# Patient Record
Sex: MALE | Race: WHITE | NOT HISPANIC OR LATINO | Employment: OTHER | URBAN - METROPOLITAN AREA
[De-identification: names, ages, dates, MRNs, and addresses within clinical notes are randomized per-mention and may not be internally consistent; named-entity substitution may affect disease eponyms.]

---

## 2017-09-22 ENCOUNTER — APPOINTMENT (OUTPATIENT)
Dept: RADIOLOGY | Facility: CLINIC | Age: 72
End: 2017-09-22
Payer: MEDICARE

## 2017-09-22 ENCOUNTER — OFFICE VISIT (OUTPATIENT)
Dept: URGENT CARE | Facility: CLINIC | Age: 72
End: 2017-09-22
Payer: MEDICARE

## 2017-09-22 DIAGNOSIS — R05.9 COUGH: ICD-10-CM

## 2017-09-22 PROCEDURE — 71020 HB CHEST X-RAY 2VW FRONTAL&LATL: CPT

## 2017-09-22 PROCEDURE — 99203 OFFICE O/P NEW LOW 30 MIN: CPT

## 2017-09-22 PROCEDURE — G0463 HOSPITAL OUTPT CLINIC VISIT: HCPCS

## 2017-09-25 ENCOUNTER — GENERIC CONVERSION - ENCOUNTER (OUTPATIENT)
Dept: OTHER | Facility: OTHER | Age: 72
End: 2017-09-25

## 2018-01-15 NOTE — RESULT NOTES
Verified Results  * XR CHEST PA & LATERAL [id number] 04:56PM Maninder Mcgrath Order Number: [id number]     Test Name Result Flag Reference   XR CHEST PA & LATERAL (Report)     CHEST - DUAL ENERGY     INDICATION: Cough and left lung pain for 2 days  COMPARISON: None     VIEWS: PA (including soft tissue/bone algorithms) and lateral projections     IMAGES: 4     FINDINGS:        Cardiomediastinal silhouette appears unremarkable  There is a localized opacity projecting over the heart shadow at the left lung base which has generally triangular shape on the frontal image and is suspicious for segmental pneumonia  It is difficult to see on the lateral view  The right lung is    clear  There is no pleural fluid or pneumothorax  Visualized osseous structures appear within normal limits for the patient's age  IMPRESSION:     Findings suspicious for segmental pneumonia in the left lung base  Recommend follow-up to ensure resolution         ##imslh##imslh       Workstation performed: [id number]     Signed by:   Flavia Ellis MD   9/22/17

## 2019-12-22 ENCOUNTER — APPOINTMENT (EMERGENCY)
Dept: CT IMAGING | Facility: HOSPITAL | Age: 74
DRG: 682 | End: 2019-12-22
Payer: MEDICARE

## 2019-12-22 ENCOUNTER — HOSPITAL ENCOUNTER (INPATIENT)
Facility: HOSPITAL | Age: 74
LOS: 5 days | Discharge: NURSING, REHAB OR LONG-TERM CARE | DRG: 682 | End: 2019-12-27
Attending: EMERGENCY MEDICINE | Admitting: INTERNAL MEDICINE
Payer: MEDICARE

## 2019-12-22 DIAGNOSIS — N39.0 UTI (URINARY TRACT INFECTION): ICD-10-CM

## 2019-12-22 DIAGNOSIS — R26.2 AMBULATORY DYSFUNCTION: Primary | ICD-10-CM

## 2019-12-22 DIAGNOSIS — R47.1 DYSARTHRIA: ICD-10-CM

## 2019-12-22 DIAGNOSIS — R33.9 URINARY RETENTION: ICD-10-CM

## 2019-12-22 DIAGNOSIS — G93.41 ACUTE METABOLIC ENCEPHALOPATHY: ICD-10-CM

## 2019-12-22 DIAGNOSIS — R47.9 SPEECH DISTURBANCE: ICD-10-CM

## 2019-12-22 DIAGNOSIS — R29.90 STROKE-LIKE SYMPTOMS: ICD-10-CM

## 2019-12-22 DIAGNOSIS — N28.9 RENAL INSUFFICIENCY: ICD-10-CM

## 2019-12-22 DIAGNOSIS — I65.22 CAROTID STENOSIS, LEFT: ICD-10-CM

## 2019-12-22 PROBLEM — N17.9 AKI (ACUTE KIDNEY INJURY) (HCC): Status: ACTIVE | Noted: 2019-12-22

## 2019-12-22 PROBLEM — N30.00 ACUTE CYSTITIS: Status: ACTIVE | Noted: 2019-12-22

## 2019-12-22 PROBLEM — I10 ESSENTIAL HYPERTENSION: Status: ACTIVE | Noted: 2019-12-22

## 2019-12-22 PROBLEM — K21.9 GASTROESOPHAGEAL REFLUX DISEASE WITHOUT ESOPHAGITIS: Status: ACTIVE | Noted: 2019-12-22

## 2019-12-22 LAB
ALBUMIN SERPL BCP-MCNC: 4 G/DL (ref 3.5–5)
ALP SERPL-CCNC: 91 U/L (ref 46–116)
ALT SERPL W P-5'-P-CCNC: 51 U/L (ref 12–78)
AMPHETAMINES SERPL QL SCN: NEGATIVE
ANION GAP BLD CALC-SCNC: 15 MMOL/L (ref 4–13)
ANION GAP SERPL CALCULATED.3IONS-SCNC: 13 MMOL/L (ref 4–13)
APAP SERPL-MCNC: <2 UG/ML (ref 10–20)
APTT PPP: 28 SECONDS (ref 23–37)
AST SERPL W P-5'-P-CCNC: 33 U/L (ref 5–45)
BACTERIA UR QL AUTO: ABNORMAL /HPF
BARBITURATES UR QL: NEGATIVE
BASOPHILS # BLD AUTO: 0.01 THOUSANDS/ΜL (ref 0–0.1)
BASOPHILS NFR BLD AUTO: 0 % (ref 0–1)
BENZODIAZ UR QL: NEGATIVE
BILIRUB SERPL-MCNC: 1.4 MG/DL (ref 0.2–1)
BILIRUB UR QL STRIP: ABNORMAL
BUN BLD-MCNC: 48 MG/DL (ref 5–25)
BUN SERPL-MCNC: 49 MG/DL (ref 5–25)
CA-I BLD-SCNC: 1.19 MMOL/L (ref 1.12–1.32)
CALCIUM SERPL-MCNC: 9.6 MG/DL (ref 8.3–10.1)
CHLORIDE BLD-SCNC: 107 MMOL/L (ref 100–108)
CHLORIDE SERPL-SCNC: 105 MMOL/L (ref 100–108)
CLARITY UR: ABNORMAL
CO2 SERPL-SCNC: 26 MMOL/L (ref 21–32)
COCAINE UR QL: NEGATIVE
COLOR UR: ABNORMAL
CREAT BLD-MCNC: 1.5 MG/DL (ref 0.6–1.3)
CREAT SERPL-MCNC: 1.68 MG/DL (ref 0.6–1.3)
EOSINOPHIL # BLD AUTO: 0 THOUSAND/ΜL (ref 0–0.61)
EOSINOPHIL NFR BLD AUTO: 0 % (ref 0–6)
ERYTHROCYTE [DISTWIDTH] IN BLOOD BY AUTOMATED COUNT: 12.4 % (ref 11.6–15.1)
ETHANOL SERPL-MCNC: <3 MG/DL (ref 0–3)
GFR SERPL CREATININE-BSD FRML MDRD: 39 ML/MIN/1.73SQ M
GFR SERPL CREATININE-BSD FRML MDRD: 45 ML/MIN/1.73SQ M
GLUCOSE SERPL-MCNC: 131 MG/DL (ref 65–140)
GLUCOSE SERPL-MCNC: 134 MG/DL (ref 65–140)
GLUCOSE UR STRIP-MCNC: NEGATIVE MG/DL
HCT VFR BLD AUTO: 46.4 % (ref 36.5–49.3)
HCT VFR BLD CALC: 46 % (ref 36.5–49.3)
HGB BLD-MCNC: 15.7 G/DL (ref 12–17)
HGB BLDA-MCNC: 15.6 G/DL (ref 12–17)
HGB UR QL STRIP.AUTO: ABNORMAL
HYALINE CASTS #/AREA URNS LPF: ABNORMAL /LPF
IMM GRANULOCYTES # BLD AUTO: 0.05 THOUSAND/UL (ref 0–0.2)
IMM GRANULOCYTES NFR BLD AUTO: 0 % (ref 0–2)
INR PPP: 1.21 (ref 0.84–1.19)
KETONES UR STRIP-MCNC: NEGATIVE MG/DL
LACTATE SERPL-SCNC: 1.7 MMOL/L (ref 0.5–2)
LEUKOCYTE ESTERASE UR QL STRIP: NEGATIVE
LYMPHOCYTES # BLD AUTO: 0.6 THOUSANDS/ΜL (ref 0.6–4.47)
LYMPHOCYTES NFR BLD AUTO: 4 % (ref 14–44)
MCH RBC QN AUTO: 30.7 PG (ref 26.8–34.3)
MCHC RBC AUTO-ENTMCNC: 33.8 G/DL (ref 31.4–37.4)
MCV RBC AUTO: 91 FL (ref 82–98)
METHADONE UR QL: NEGATIVE
MONOCYTES # BLD AUTO: 1.59 THOUSAND/ΜL (ref 0.17–1.22)
MONOCYTES NFR BLD AUTO: 11 % (ref 4–12)
MUCOUS THREADS UR QL AUTO: ABNORMAL
NEUTROPHILS # BLD AUTO: 12.19 THOUSANDS/ΜL (ref 1.85–7.62)
NEUTS SEG NFR BLD AUTO: 85 % (ref 43–75)
NITRITE UR QL STRIP: NEGATIVE
NON-SQ EPI CELLS URNS QL MICRO: ABNORMAL /HPF
NRBC BLD AUTO-RTO: 0 /100 WBCS
OPIATES UR QL SCN: NEGATIVE
PCO2 BLD: 26 MMOL/L (ref 21–32)
PCP UR QL: NEGATIVE
PH UR STRIP.AUTO: 5.5 [PH]
PLATELET # BLD AUTO: 189 THOUSANDS/UL (ref 149–390)
PMV BLD AUTO: 10.2 FL (ref 8.9–12.7)
POTASSIUM BLD-SCNC: 4 MMOL/L (ref 3.5–5.3)
POTASSIUM SERPL-SCNC: 3.7 MMOL/L (ref 3.5–5.3)
PROT SERPL-MCNC: 7.8 G/DL (ref 6.4–8.2)
PROT UR STRIP-MCNC: ABNORMAL MG/DL
PROTHROMBIN TIME: 15.4 SECONDS (ref 11.6–14.5)
RBC # BLD AUTO: 5.11 MILLION/UL (ref 3.88–5.62)
RBC #/AREA URNS AUTO: ABNORMAL /HPF
SALICYLATES SERPL-MCNC: <3 MG/DL (ref 3–20)
SODIUM BLD-SCNC: 143 MMOL/L (ref 136–145)
SODIUM SERPL-SCNC: 144 MMOL/L (ref 136–145)
SP GR UR STRIP.AUTO: 1.02 (ref 1–1.03)
SPECIMEN SOURCE: ABNORMAL
THC UR QL: NEGATIVE
TROPONIN I SERPL-MCNC: <0.02 NG/ML
TSH SERPL DL<=0.05 MIU/L-ACNC: 0.48 UIU/ML (ref 0.36–3.74)
UROBILINOGEN UR QL STRIP.AUTO: 0.2 E.U./DL
WBC # BLD AUTO: 14.44 THOUSAND/UL (ref 4.31–10.16)
WBC #/AREA URNS AUTO: ABNORMAL /HPF

## 2019-12-22 PROCEDURE — 80053 COMPREHEN METABOLIC PANEL: CPT | Performed by: EMERGENCY MEDICINE

## 2019-12-22 PROCEDURE — 96361 HYDRATE IV INFUSION ADD-ON: CPT

## 2019-12-22 PROCEDURE — 80307 DRUG TEST PRSMV CHEM ANLYZR: CPT | Performed by: EMERGENCY MEDICINE

## 2019-12-22 PROCEDURE — 81001 URINALYSIS AUTO W/SCOPE: CPT | Performed by: EMERGENCY MEDICINE

## 2019-12-22 PROCEDURE — 83605 ASSAY OF LACTIC ACID: CPT | Performed by: EMERGENCY MEDICINE

## 2019-12-22 PROCEDURE — 80329 ANALGESICS NON-OPIOID 1 OR 2: CPT | Performed by: EMERGENCY MEDICINE

## 2019-12-22 PROCEDURE — 87040 BLOOD CULTURE FOR BACTERIA: CPT | Performed by: EMERGENCY MEDICINE

## 2019-12-22 PROCEDURE — 70498 CT ANGIOGRAPHY NECK: CPT

## 2019-12-22 PROCEDURE — 84443 ASSAY THYROID STIM HORMONE: CPT | Performed by: EMERGENCY MEDICINE

## 2019-12-22 PROCEDURE — 85025 COMPLETE CBC W/AUTO DIFF WBC: CPT | Performed by: EMERGENCY MEDICINE

## 2019-12-22 PROCEDURE — 99285 EMERGENCY DEPT VISIT HI MDM: CPT | Performed by: EMERGENCY MEDICINE

## 2019-12-22 PROCEDURE — 93005 ELECTROCARDIOGRAM TRACING: CPT

## 2019-12-22 PROCEDURE — 85730 THROMBOPLASTIN TIME PARTIAL: CPT | Performed by: EMERGENCY MEDICINE

## 2019-12-22 PROCEDURE — 74176 CT ABD & PELVIS W/O CONTRAST: CPT

## 2019-12-22 PROCEDURE — 71250 CT THORAX DX C-: CPT

## 2019-12-22 PROCEDURE — 72125 CT NECK SPINE W/O DYE: CPT

## 2019-12-22 PROCEDURE — 70496 CT ANGIOGRAPHY HEAD: CPT

## 2019-12-22 PROCEDURE — 99285 EMERGENCY DEPT VISIT HI MDM: CPT

## 2019-12-22 PROCEDURE — 36415 COLL VENOUS BLD VENIPUNCTURE: CPT | Performed by: EMERGENCY MEDICINE

## 2019-12-22 PROCEDURE — 84484 ASSAY OF TROPONIN QUANT: CPT | Performed by: EMERGENCY MEDICINE

## 2019-12-22 PROCEDURE — 96360 HYDRATION IV INFUSION INIT: CPT

## 2019-12-22 PROCEDURE — 80320 DRUG SCREEN QUANTALCOHOLS: CPT | Performed by: EMERGENCY MEDICINE

## 2019-12-22 PROCEDURE — 85014 HEMATOCRIT: CPT

## 2019-12-22 PROCEDURE — 85610 PROTHROMBIN TIME: CPT | Performed by: EMERGENCY MEDICINE

## 2019-12-22 PROCEDURE — 1124F ACP DISCUSS-NO DSCNMKR DOCD: CPT | Performed by: PHYSICIAN ASSISTANT

## 2019-12-22 PROCEDURE — 80047 BASIC METABLC PNL IONIZED CA: CPT

## 2019-12-22 PROCEDURE — 99222 1ST HOSP IP/OBS MODERATE 55: CPT | Performed by: PHYSICIAN ASSISTANT

## 2019-12-22 RX ORDER — SODIUM CHLORIDE 9 MG/ML
125 INJECTION, SOLUTION INTRAVENOUS CONTINUOUS
Status: DISCONTINUED | OUTPATIENT
Start: 2019-12-22 | End: 2019-12-22

## 2019-12-22 RX ORDER — CEFTRIAXONE 1 G/50ML
1000 INJECTION, SOLUTION INTRAVENOUS ONCE
Status: COMPLETED | OUTPATIENT
Start: 2019-12-22 | End: 2019-12-22

## 2019-12-22 RX ORDER — ALLOPURINOL 300 MG/1
300 TABLET ORAL DAILY
COMMUNITY

## 2019-12-22 RX ORDER — ATORVASTATIN CALCIUM 40 MG/1
40 TABLET, FILM COATED ORAL EVERY EVENING
Status: DISCONTINUED | OUTPATIENT
Start: 2019-12-22 | End: 2019-12-27 | Stop reason: HOSPADM

## 2019-12-22 RX ORDER — AMLODIPINE BESYLATE 10 MG/1
10 TABLET ORAL DAILY
COMMUNITY

## 2019-12-22 RX ORDER — SODIUM CHLORIDE 9 MG/ML
100 INJECTION, SOLUTION INTRAVENOUS CONTINUOUS
Status: DISCONTINUED | OUTPATIENT
Start: 2019-12-22 | End: 2019-12-24

## 2019-12-22 RX ORDER — ONDANSETRON 2 MG/ML
4 INJECTION INTRAMUSCULAR; INTRAVENOUS EVERY 6 HOURS PRN
Status: DISCONTINUED | OUTPATIENT
Start: 2019-12-22 | End: 2019-12-27 | Stop reason: HOSPADM

## 2019-12-22 RX ORDER — ASPIRIN 81 MG/1
81 TABLET, CHEWABLE ORAL DAILY
Status: DISCONTINUED | OUTPATIENT
Start: 2019-12-23 | End: 2019-12-27 | Stop reason: HOSPADM

## 2019-12-22 RX ORDER — OMEPRAZOLE 20 MG/1
20 CAPSULE, DELAYED RELEASE ORAL DAILY
COMMUNITY

## 2019-12-22 RX ADMIN — SODIUM CHLORIDE 125 ML/HR: 0.9 INJECTION, SOLUTION INTRAVENOUS at 19:10

## 2019-12-22 RX ADMIN — SODIUM CHLORIDE 100 ML/HR: 0.9 INJECTION, SOLUTION INTRAVENOUS at 20:30

## 2019-12-22 RX ADMIN — IODIXANOL 100 ML: 320 INJECTION, SOLUTION INTRAVASCULAR at 17:13

## 2019-12-22 RX ADMIN — CEFTRIAXONE 1000 MG: 1 INJECTION, SOLUTION INTRAVENOUS at 19:06

## 2019-12-22 RX ADMIN — SODIUM CHLORIDE 500 ML: 0.9 INJECTION, SOLUTION INTRAVENOUS at 16:31

## 2019-12-22 NOTE — ASSESSMENT & PLAN NOTE
Creatinine at 1 65  No document hx of CKD  Avoid nephrotoxic agents  AM CMP  Nephrology consult if no improvement

## 2019-12-22 NOTE — H&P
[facility]  H&P- Perez Pino 1945, 75 y o  male MRN: [id number]    Unit/Bed#: 426-01 Encounter: [id number]    Primary Care Provider: No primary care provider on file  Date and time admitted to hospital: 12/22/2019  4:16 PM        * Stroke-like symptoms  Assessment & Plan  He present to the ER with complaints of generalized weakness  His wife and daughter reports that he has become very confused with slurred speech over the last few days  She also reports that he had a fall 2-3 days ago hitting his head  No LOC  CTA shows-1   No acute intracranial hemorrhage, midline shift, or mass effect  2   Chronic small vessel ischemic changes  3   If there is continued clinical concern for acute infarct, further evaluation with MRI may be obtained which is more sensitive  4   80% narrowing of the left internal carotid artery just distal to the bifurcation  5   Mild to moderate atherosclerotic narrowing of the cavernous and supraclinoid portions of the bilateral internal carotid arteries  6   Mild focal narrowing of the intracranial right vertebral artery and moderate short segment narrowing of the intracranial left vertebral artery  7   No high-grade proximal stenosis of the visualized [place]  8   No significant intracranial arteriovenous malformation or aneurysm  Continue stroke pathway  Telemetry monitoring  Will keep NPO pending swallow eval  Daily aspirin, Lipitor  Check MRI  Check echo  Check A1C, lipid panel  Monitor blood pressure closely  OT,PT speech eval  A m  Labs  Neurology consult        Acute cystitis  Assessment & Plan  UA shows-4-10 WBCs, innumerable bacteria, moderate blood  Leukocytosis present  CT abdomen and pelvis shows- 4   Marked bladder distention  Started on IV ceftriaxone in the ER  Urine cultures blood cultures pending  Monitor urinary output, renal function  A m   Labs  Supportive care      KURT (acute kidney injury) [facility])  Assessment & Plan  Creatinine at 1 65  No document hx of CKD  Avoid nephrotoxic agents  AM CMP  Nephrology consult if no improvement    Gastroesophageal reflux disease without esophagitis  Assessment & Plan  He is currently on Prilosec for 20 mg p o  Daily  Will give formulary alternative Protonix 40 mg p o  Daily    Essential hypertension  Assessment & Plan  Blood pressure is stable  Continue PTA medciations      VTE Prophylaxis: Pharmacologic VTE Prophylaxis contraindicated due to hematuria on UA  / sequential compression device   Code Status: Level 1- Full code  POLST: POLST form is not discussed and not completed at this time  Discussion with family: Spoke to wife and Daughter Via phone    Anticipated Length of Stay:  Patient will be admitted on an Inpatient basis with an anticipated length of stay of  > 2 midnights  Justification for Hospital Stay: Stroke like symptoms, UTI, KURT    Total Time for Visit, including Counseling / Coordination of Care: 30 minutes  Greater than 50% of this total time spent on direct patient counseling and coordination of care  Chief Complaint:  Slurred speech    History of Present Illness:    Misha Mann is a 75 y o  male who presents to the emergency room with complaints of generalized weakness and slurred speech and increased confusion over last 2-3 days getting progressively worse  His family reports that he has been having several falls over the past couple of weeks most recent was about 3 days ago hitting his head however he had no LOC  His wife also reports that he had increased weakness to his lower extremities decrease in his ability to ambulate as well as he has not been eating or drinking very much over the last few days  His past medical history significant for GERD and hypertension  His family also reports that he has been evaluated at [facility] for possible dementia, Alzheimer's, Parkinson's  He also reports that he had several CT scans, MRI as well as EEG    He was also prescribed galantamine which was discontinued by his family as they thought that it was contributing to his complaints of headaches  Labs completed in the ER with results as shown below  CTA head and neck, CT chest abdomen and pelvis, and CT cervical spine completed with results shown below  He received normal saline bolus 500 cc in the ER  He was also started on ceftriaxone 1 g IV  At bedside he is awake obviously confused  He does not formulate any specific complaints  The patient has been admitted on inpatient status med [facility] level care for further workup and management stroke-like symptoms, acute cystitis, acute kidney injury    Review of Systems:    Review of Systems   Unable to perform ROS: Mental status change       Past Medical and Surgical History:     Past Medical History:   Diagnosis Date    GERD (gastroesophageal reflux disease)     Hypertension        History reviewed  No pertinent surgical history  Meds/Allergies:    Prior to Admission medications    Not on File     I have reviewed home medications with patient family member  Allergies: Allergies   Allergen Reactions    Penicillins        Social History:     Marital Status: /Civil Union   Occupation: Retired  Patient Pre-hospital Living Situation: Lives with wife  Patient Pre-hospital Level of Mobility: Active, uses cane and walker  Wife reports mobility has decreased significantly over the pas several days  Patient Pre-hospital Diet Restrictions: None reported  Substance Use History:   Social History     Substance and Sexual Activity   Alcohol Use Never    Frequency: Never     Social History     Tobacco Use   Smoking Status Former Smoker   Smokeless Tobacco Never Used     Social History     Substance and Sexual Activity   Drug Use Never       Family History:    History reviewed  No pertinent family history      Physical Exam:     Vitals:   Blood Pressure: 122/66 (12/22/19 2232)  Pulse: 91 (12/22/19 2232)  Temperature: 98 5 °F (36 9 °C) (12/22/19 2232)  Temp Source: Temporal (12/22/19 2232)  Respirations: 18 (12/22/19 2232)  Weight - Scale: 69 4 kg (153 lb) (12/22/19 1926)  SpO2: 96 % (12/22/19 2232)    Physical Exam   Constitutional: No distress  HENT:   Head: Normocephalic and atraumatic  Mouth/Throat: Oropharynx is clear and moist    Eyes: Pupils are equal, round, and reactive to light  EOM are normal  Right eye exhibits no discharge  Left eye exhibits no discharge  No scleral icterus  Neck: Neck supple  No JVD present  Cardiovascular: Normal rate, regular rhythm and intact distal pulses  Pulmonary/Chest: Effort normal and breath sounds normal  No stridor  No respiratory distress  He has no wheezes  He has no rales  Abdominal: Soft  Bowel sounds are normal  He exhibits no distension and no mass  There is no tenderness  There is no guarding  Genitourinary:   Genitourinary Comments: Intact hoyos cath   Musculoskeletal: He exhibits no edema, tenderness or deformity  2/5 strength bilateral lower extremities   Lymphadenopathy:     He has no cervical adenopathy  Neurological:   Awake ,confused   Skin: Skin is warm and dry  Capillary refill takes less than 2 seconds  No rash noted  He is not diaphoretic  No erythema  No pallor  Vitals reviewed  Additional Data:     Lab Results: I have personally reviewed pertinent reports        Results from last 7 days   Lab Units 12/22/19  1629   WBC Thousand/uL 14 44*   HEMOGLOBIN g/dL 15 7   I STAT HEMOGLOBIN g/dl 15 6   HEMATOCRIT % 46 4   HEMATOCRIT, ISTAT % 46   PLATELETS Thousands/uL 189   NEUTROS PCT % 85*   LYMPHS PCT % 4*   MONOS PCT % 11   EOS PCT % 0     Results from last 7 days   Lab Units 12/22/19  1629   SODIUM mmol/L 144   POTASSIUM mmol/L 3 7   CHLORIDE mmol/L 105   CO2 mmol/L 26   CO2, I-STAT mmol/L 26   BUN mg/dL 49*   CREATININE mg/dL 1 68*   AGAP mmol/L 15*   ANION GAP mmol/L 13   CALCIUM mg/dL 9 6   ALBUMIN g/dL 4 0   TOTAL BILIRUBIN mg/dL 1 40*   ALK PHOS U/L 91   ALT U/L 51   AST U/L 33   GLUCOSE RANDOM mg/dL 131     Results from last 7 days   Lab Units 12/22/19  1629   INR  1 21*             Results from last 7 days   Lab Units 12/22/19  1629   LACTIC ACID mmol/L 1 7       Imaging: I have personally reviewed pertinent reports  CT chest abdomen pelvis wo contrast   Final Result by Li Moore MD (12/22 1811)         1  No definite acute visceral and/or osseous injury in the chest, abdomen, or pelvis  2   Debris in the proximal trachea likely mucous secretions  3   Abundant stool in the rectum with secondary stercoral colitis  4   Marked bladder distention  Workstation performed: [id number]         CTA head and neck with and without contrast   Final Result by Elisabeth Carrizales MD (12/22 1803)      1  No acute intracranial hemorrhage, midline shift, or mass effect  2   Chronic small vessel ischemic changes  3   If there is continued clinical concern for acute infarct, further evaluation with MRI may be obtained which is more sensitive  4   80% narrowing of the left internal carotid artery just distal to the bifurcation  5   Mild to moderate atherosclerotic narrowing of the cavernous and supraclinoid portions of the bilateral internal carotid arteries  6   Mild focal narrowing of the intracranial right vertebral artery and moderate short segment narrowing of the intracranial left vertebral artery  7   No high-grade proximal stenosis of the visualized [place]  8   No significant intracranial arteriovenous malformation or aneurysm  I personally discussed this study with Sara Langley on 12/22/2019 at 6:03 PM       Workstation performed: [id number]         CT cervical spine without contrast   Final Result by Elisabeth Carrizales MD (12/22 1803)      No cervical spine fracture or traumatic malalignment    Moderate multilevel degenerative changes most pronounced at C5-C6 where there is severe neural foraminal narrowing on the left and moderate neural foraminal narrowing on the right  Workstation performed: [id number]         MRI Inpatient Order    (Results Pending)       EKG, Pathology, and Other Studies Reviewed on Admission:   · EKG: Sinus tachycardia at rate of 104 bpm    Allscripts / Epic Records Reviewed: Yes     ** Please Note: This note has been constructed using a voice recognition system   **

## 2019-12-22 NOTE — ASSESSMENT & PLAN NOTE
He is currently on Prilosec for 20 mg p o  Daily  Will give formulary alternative Protonix 40 mg p o   Daily

## 2019-12-22 NOTE — ASSESSMENT & PLAN NOTE
UA shows-4-10 WBCs, innumerable bacteria, moderate blood  Leukocytosis present  CT abdomen and pelvis shows- 4   Marked bladder distention  Started on IV ceftriaxone in the ER  Urine cultures blood cultures pending  Monitor urinary output, renal function  A m   Labs  Supportive care

## 2019-12-22 NOTE — ED PROCEDURE NOTE
PROCEDURE  ECG 12 Lead Documentation Only  Date/Time: 12/22/2019 5:23 PM  Performed by: Penny Gutierrez DO  Authorized by: Penny Gutierrez DO     Indications / Diagnosis:  Weakness   ECG reviewed by me, the ED Provider: yes    Patient location:  ED  Previous ECG:     Previous ECG:  Unavailable  Interpretation:     Interpretation: non-specific    Rate:     ECG rate:  104    ECG rate assessment comment:  Unable to be determined  Rhythm:     Rhythm comment:  Unable to be determined  Ectopy:     Ectopy comment:  EKG appears sinus on the monitor         Penny Gutierrez DO  12/22/19 1724

## 2019-12-22 NOTE — ED PROVIDER NOTES
History  Chief Complaint   Patient presents with    Slurred Speech     slurred speech for 1 week  also has falls x1 month  74 y/o male with "dementia" who has been being seen at the [place] presents with increased weakness with falls x 2 weeks  Patients family took him off of Galantamine and tamosulin  Patient has been worked up for multiple forms dementia  Family feels though is not eating well and is unable ambulate on his own  As per family over the course last 2 weeks patient has declined precipitously since he has had multiple falls  History provided by:  Patient and relative  Fatigue   Severity:  Mild  Onset quality:  Gradual  Timing:  Constant  Progression:  Worsening  Context: not alcohol use, not allergies and not change in medication    Relieved by:  Nothing  Worsened by:  Nothing  Ineffective treatments:  None tried  Associated symptoms: anorexia, ataxia and difficulty walking    Associated symptoms: no abdominal pain, no aphasia, no arthralgias, no chest pain, no cough, no diarrhea, no dizziness, no drooling, no dysuria and no headaches    Risk factors: no anemia and no congestive heart failure        None       Past Medical History:   Diagnosis Date    GERD (gastroesophageal reflux disease)     Hypertension        History reviewed  No pertinent surgical history  History reviewed  No pertinent family history  I have reviewed and agree with the history as documented  Social History     Tobacco Use    Smoking status: Former Smoker    Smokeless tobacco: Never Used   Substance Use Topics    Alcohol use: Never     Frequency: Never    Drug use: Never        Review of Systems   Constitutional: Positive for fatigue  HENT: Negative  Negative for congestion, dental problem, drooling and ear discharge  Eyes: Negative for discharge and itching  Respiratory: Negative for cough  Cardiovascular: Negative for chest pain  Gastrointestinal: Positive for anorexia   Negative for abdominal pain and diarrhea  Endocrine: Negative for cold intolerance, heat intolerance and polydipsia  Genitourinary: Negative for difficulty urinating, dysuria and enuresis  Musculoskeletal: Negative for arthralgias  Skin: Negative for color change, pallor and rash  Allergic/Immunologic: Negative for environmental allergies and food allergies  Neurological: Negative for dizziness, facial asymmetry and headaches  Hematological: Negative for adenopathy  Psychiatric/Behavioral: Negative for agitation and behavioral problems  Physical Exam  Physical Exam   Constitutional: He is oriented to person, place, and time  He appears well-developed  HENT:   Head: Normocephalic  Right Ear: External ear normal    Left Ear: External ear normal    Eyes: Pupils are equal, round, and reactive to light  Right eye exhibits no discharge  Left eye exhibits no discharge  Neck: Normal range of motion  No tracheal deviation present  No thyromegaly present  Cardiovascular: Normal rate, regular rhythm and normal heart sounds  Pulmonary/Chest: Effort normal  No stridor  No respiratory distress  Abdominal: Soft  He exhibits no distension  There is no tenderness  There is no guarding  Musculoskeletal: He exhibits no edema or deformity  Rigid but able to bend extremities   Neurological: He is alert and oriented to person, place, and time  No cranial nerve deficit  Coordination normal    Skin: Skin is warm  Capillary refill takes less than 2 seconds  No erythema  No pallor  Psychiatric:   Blunted affect   Vitals reviewed        Vital Signs  ED Triage Vitals [12/22/19 1618]   Temperature Pulse Respirations Blood Pressure SpO2   99 6 °F (37 6 °C) 91 14 157/74 95 %      Temp Source Heart Rate Source Patient Position - Orthostatic VS BP Location FiO2 (%)   Oral Monitor Lying Left arm --      Pain Score       No Pain           Vitals:    12/22/19 1618   BP: 157/74   Pulse: 91   Patient Position - Orthostatic VS: Lying Visual Acuity  Visual Acuity      Most Recent Value   L Pupil Size (mm)  2   R Pupil Size (mm)  2   L Pupil Shape  Round   R Pupil Shape  Round          ED Medications  Medications   iohexol (OMNIPAQUE) 350 MG/ML injection (SINGLE-DOSE) 100 mL ( Intravenous Not Given 12/22/19 1713)   iohexol (OMNIPAQUE) 350 MG/ML injection (SINGLE-DOSE) 70 mL ( Intravenous Not Given 12/22/19 1713)   sodium chloride 0 9 % infusion (has no administration in time range)   sodium chloride 0 9 % bolus 500 mL (500 mL Intravenous New Bag 12/22/19 1631)   iodixanol (VISIPAQUE) 320 MG/ML injection 100 mL (100 mL Intravenous Given 12/22/19 1713)       Diagnostic Studies  Results Reviewed     Procedure Component Value Units Date/Time    Rapid drug screen, urine [[id number]]  (Normal) Collected:  12/22/19 1731    Lab Status:  Final result Specimen:  Urine, Other Updated:  12/22/19 1749     Amph/Meth UR Negative     Barbiturate Ur Negative     Benzodiazepine Urine Negative     Cocaine Urine Negative     Methadone Urine Negative     Opiate Urine Negative     PCP Ur Negative     THC Urine Negative    Narrative:       FOR MEDICAL PURPOSES ONLY  IF CONFIRMATION NEEDED PLEASE CONTACT THE LAB WITHIN 5 DAYS      Drug Screen Cutoff Levels:  AMPHETAMINE/METHAMPHETAMINES  1000 ng/mL  BARBITURATES     200 ng/mL  BENZODIAZEPINES     200 ng/mL  COCAINE      300 ng/mL  METHADONE      300 ng/mL  OPIATES      300 ng/mL  PHENCYCLIDINE     25 ng/mL  THC       50 ng/mL      Urine Microscopic [[id number]]  (Abnormal) Collected:  12/22/19 1729    Lab Status:  Final result Specimen:  Urine, Other Updated:  12/22/19 1744     RBC, UA 2-4 /hpf      WBC, UA 4-10 /hpf      Epithelial Cells Occasional /hpf      Bacteria, UA Innumerable /hpf      Hyaline Casts, UA 0-1 /lpf      MUCUS THREADS Moderate    UA w Reflex to Microscopic w Reflex to Culture [[id number]]  (Abnormal) Collected:  12/22/19 1729    Lab Status:  Final result Specimen:  Urine, Other Updated: 12/22/19 1737     Color, UA Alicia     Clarity, UA Slightly Cloudy     Specific Gravity, UA 1 025     pH, UA 5 5     Leukocytes, UA Negative     Nitrite, UA Negative     Protein, UA 30 (1+) mg/dl      Glucose, UA Negative mg/dl      Ketones, UA Negative mg/dl      Urobilinogen, UA 0 2 E U /dl      Bilirubin, UA Interference- unable to analyze     Blood, UA Moderate    Blood culture #1 [[id number]]     Lab Status:  No result Specimen:  Blood     Blood culture #2 [[id number]]     Lab Status:  No result Specimen:  Blood     TSH [[id number]]  (Normal) Collected:  12/22/19 1629    Lab Status:  Final result Specimen:  Blood from Arm, Left Updated:  12/22/19 1700     TSH 3RD GENERATON 0 481 uIU/mL     Narrative:       Patients undergoing fluorescein dye angiography may retain small amounts of fluorescein in the body for 48-72 hours post procedure  Samples containing fluorescein can produce falsely depressed TSH values  If the patient had this procedure,a specimen should be resubmitted post fluorescein clearance  Salicylate level [[id number]]  (Abnormal) Collected:  12/22/19 1629    Lab Status:  Final result Specimen:  Blood from Arm, Left Updated:  18/42/91 0162     Salicylate Lvl <0 6 mg/dL     Acetaminophen level-If concentration is detectable, please discuss with medical  on call  [[id number]]  (Abnormal) Collected:  12/22/19 1629    Lab Status:  Final result Specimen:  Blood from Arm, Left Updated:  12/22/19 1700     Acetaminophen Level <2 0 ug/mL     Troponin I [[id number]]  (Normal) Collected:  12/22/19 1629    Lab Status:  Final result Specimen:  Blood from Arm, Left Updated:  12/22/19 1653     Troponin I <0 02 ng/mL     Lactic acid, plasma [[id number]]  (Normal) Collected:  12/22/19 1629    Lab Status:  Final result Specimen:  Blood from Arm, Left Updated:  12/22/19 1653     LACTIC ACID 1 7 mmol/L     Narrative:       Result may be elevated if tourniquet was used during collection      Comprehensive metabolic panel [[id number]]  (Abnormal) Collected:  12/22/19 1629    Lab Status:  Final result Specimen:  Blood from Arm, Left Updated:  12/22/19 1651     Sodium 144 mmol/L      Potassium 3 7 mmol/L      Chloride 105 mmol/L      CO2 26 mmol/L      ANION GAP 13 mmol/L      BUN 49 mg/dL      Creatinine 1 68 mg/dL      Glucose 131 mg/dL      Calcium 9 6 mg/dL      AST 33 U/L      ALT 51 U/L      Alkaline Phosphatase 91 U/L      Total Protein 7 8 g/dL      Albumin 4 0 g/dL      Total Bilirubin 1 40 mg/dL      eGFR 39 ml/min/1 73sq m     Narrative:       [place] guidelines for Chronic Kidney Disease (CKD):     Stage 1 with normal or high GFR (GFR > 90 mL/min/1 73 square meters)    Stage 2 Mild CKD (GFR = 60-89 mL/min/1 73 square meters)    Stage 3A Moderate CKD (GFR = 45-59 mL/min/1 73 square meters)    Stage 3B Moderate CKD (GFR = 30-44 mL/min/1 73 square meters)    Stage 4 Severe CKD (GFR = 15-29 mL/min/1 73 square meters)    Stage 5 End Stage CKD (GFR <15 mL/min/1 73 square meters)  Note: GFR calculation is accurate only with a steady state creatinine    Protime-INR [[id number]]  (Abnormal) Collected:  12/22/19 1629    Lab Status:  Final result Specimen:  Blood from Arm, Left Updated:  12/22/19 1645     Protime 15 4 seconds      INR 1 21    APTT [[id number]]  (Normal) Collected:  12/22/19 1629    Lab Status:  Final result Specimen:  Blood from Arm, Left Updated:  12/22/19 1645     PTT 28 seconds     Ethanol [[id number]]  (Normal) Collected:  12/22/19 1629    Lab Status:  Final result Specimen:  Blood from Arm, Left Updated:  12/22/19 1644     Ethanol Lvl <3 mg/dL     CBC and differential [[id number]]  (Abnormal) Collected:  12/22/19 1629    Lab Status:  Final result Specimen:  Blood from Arm, Left Updated:  12/22/19 1634     WBC 14 44 Thousand/uL      RBC 5 11 Million/uL      Hemoglobin 15 7 g/dL      Hematocrit 46 4 %      MCV 91 fL      MCH 30 7 pg      MCHC 33 8 g/dL      RDW 12 4 % MPV 10 2 fL      Platelets 631 Thousands/uL      nRBC 0 /100 WBCs      Neutrophils Relative 85 %      Immat GRANS % 0 %      Lymphocytes Relative 4 %      Monocytes Relative 11 %      Eosinophils Relative 0 %      Basophils Relative 0 %      Neutrophils Absolute 12 19 Thousands/µL      Immature Grans Absolute 0 05 Thousand/uL      Lymphocytes Absolute 0 60 Thousands/µL      Monocytes Absolute 1 59 Thousand/µL      Eosinophils Absolute 0 00 Thousand/µL      Basophils Absolute 0 01 Thousands/µL     POCT Chem 8+ [[id number]]  (Abnormal) Collected:  12/22/19 1629    Lab Status:  Final result Specimen:  Venous Updated:  12/22/19 1633     SODIUM, I-STAT 143 mmol/l      Potassium, i-STAT 4 0 mmol/L      Chloride, istat 107 mmol/L      CO2, i-STAT 26 mmol/L      Anion Gap, i-STAT 15 mmol/L      Calcium, Ionized i-STAT 1 19 mmol/L      BUN, I-STAT 48 mg/dl      Creatinine, i-STAT 1 5 mg/dl      eGFR 45 ml/min/1 73sq m      Glucose, i-STAT 134 mg/dl      Hct, i-STAT 46 %      Hgb, i-STAT 15 6 g/dl      Specimen Type VENOUS    Narrative:       National Kid[employer] Disease Foundation guidelines for Chronic Kidney Disease (CKD):     Stage 1 with normal or high GFR (GFR > 90 mL/min/1 73 square meters)    Stage 2 Mild CKD (GFR = 60-89 mL/min/1 73 square meters)    Stage 3A Moderate CKD (GFR = 45-59 mL/min/1 73 square meters)    Stage 3B Moderate CKD (GFR = 30-44 mL/min/1 73 square meters)    Stage 4 Severe CKD (GFR = 15-29 mL/min/1 73 square meters)    Stage 5 End Stage CKD (GFR <15 mL/min/1 73 square meters)  Note: GFR calculation is accurate only with a steady state creatinine                 CTA head and neck with and without contrast    (Results Pending)   CT cervical spine without contrast    (Results Pending)   CT chest abdomen pelvis wo contrast    (Results Pending)              Procedures  Procedures         ED Course         HEART Risk Score      Most Recent Value   History  1 Filed at: 12/22/2019 1722   ECG  1 Filed at: 12/22/2019 1722   Age  2 Filed at: 12/22/2019 1722   Risk Factors  1 Filed at: 12/22/2019 1722   Troponin  0 Filed at: 12/22/2019 1722   Heart Score Risk Calculator   History  1 Filed at: 12/22/2019 1722   ECG  1 Filed at: 12/22/2019 1722   Age  2 Filed at: 12/22/2019 1722   Risk Factors  1 Filed at: 12/22/2019 1722   Troponin  0 Filed at: 12/22/2019 1722   HEART Score  5 Filed at: 12/22/2019 1722   HEART Score  5 Filed at: 12/22/2019 1722                            MDM  Number of Diagnoses or Management Options  Ambulatory dysfunction:   Renal insufficiency:   Speech disturbance:   UTI (urinary tract infection):   Diagnosis management comments: Differential diagnosis 1  CVA 2  TIA 3  Electrolyte abnormality    17:14  I spoke with family - forgetfulness began 5 years ago  Tremors 3 years ago- [facility] thought Parkinsons  Lewy Body dementia   Patient fell 4 days ago and has not been eating or drinking     [place] catheter placed    18:00  I Spoke with Dr Renetta Zarate - no acute stroke    Narrowing of the left internal carotid        Amount and/or Complexity of Data Reviewed  Clinical lab tests: ordered and reviewed  Tests in the radiology section of CPT®: ordered and reviewed  Tests in the medicine section of CPT®: reviewed and ordered    Risk of Complications, Morbidity, and/or Mortality  Presenting problems: high  Diagnostic procedures: high  Management options: high          Disposition  Final diagnoses:   Ambulatory dysfunction   Renal insufficiency     Time reflects when diagnosis was documented in both MDM as applicable and the Disposition within this note     Time User Action Codes Description Comment    12/22/2019  5:21 PM Ramirez Kenny Add [R26 2] Ambulatory dysfunction     12/22/2019  5:21 PM Ramirez Kenny Add [N28 9] Renal insufficiency       ED Disposition     ED Disposition Condition Date/Time Comment    Admit Stable Sun Dec 22, 2019  5:21 PM         Follow-up Information    None         Patient's Medications    No medications on file     No discharge procedures on file      ED Provider  Electronically Signed by           Jose R Vincent DO  12/22/19 2545

## 2019-12-23 ENCOUNTER — APPOINTMENT (INPATIENT)
Dept: MRI IMAGING | Facility: HOSPITAL | Age: 74
DRG: 682 | End: 2019-12-23
Payer: MEDICARE

## 2019-12-23 ENCOUNTER — APPOINTMENT (INPATIENT)
Dept: NON INVASIVE DIAGNOSTICS | Facility: HOSPITAL | Age: 74
DRG: 682 | End: 2019-12-23
Payer: MEDICARE

## 2019-12-23 PROBLEM — R47.1 DYSARTHRIA: Status: ACTIVE | Noted: 2019-12-22

## 2019-12-23 PROBLEM — E87.0 HYPERNATREMIA: Status: ACTIVE | Noted: 2019-12-23

## 2019-12-23 PROBLEM — R53.1 GENERALIZED WEAKNESS: Status: ACTIVE | Noted: 2019-12-23

## 2019-12-23 PROBLEM — G93.41 ACUTE METABOLIC ENCEPHALOPATHY: Status: ACTIVE | Noted: 2019-12-23

## 2019-12-23 LAB
ALBUMIN SERPL BCP-MCNC: 3.2 G/DL (ref 3.5–5)
ALP SERPL-CCNC: 76 U/L (ref 46–116)
ALT SERPL W P-5'-P-CCNC: 45 U/L (ref 12–78)
ANION GAP SERPL CALCULATED.3IONS-SCNC: 12 MMOL/L (ref 4–13)
AST SERPL W P-5'-P-CCNC: 34 U/L (ref 5–45)
ATRIAL RATE: 91 BPM
ATRIAL RATE: 97 BPM
BILIRUB SERPL-MCNC: 1.1 MG/DL (ref 0.2–1)
BUN SERPL-MCNC: 33 MG/DL (ref 5–25)
CALCIUM SERPL-MCNC: 8.8 MG/DL (ref 8.3–10.1)
CHLORIDE SERPL-SCNC: 112 MMOL/L (ref 100–108)
CHOLEST SERPL-MCNC: 105 MG/DL (ref 50–200)
CO2 SERPL-SCNC: 25 MMOL/L (ref 21–32)
CREAT SERPL-MCNC: 1.01 MG/DL (ref 0.6–1.3)
ERYTHROCYTE [DISTWIDTH] IN BLOOD BY AUTOMATED COUNT: 12.4 % (ref 11.6–15.1)
EST. AVERAGE GLUCOSE BLD GHB EST-MCNC: 97 MG/DL
GFR SERPL CREATININE-BSD FRML MDRD: 72 ML/MIN/1.73SQ M
GLUCOSE SERPL-MCNC: 95 MG/DL (ref 65–140)
HBA1C MFR BLD: 5 % (ref 4.2–6.3)
HCT VFR BLD AUTO: 40.2 % (ref 36.5–49.3)
HDLC SERPL-MCNC: 41 MG/DL
HGB BLD-MCNC: 13.5 G/DL (ref 12–17)
LDLC SERPL CALC-MCNC: 53 MG/DL (ref 0–100)
MAGNESIUM SERPL-MCNC: 2.2 MG/DL (ref 1.6–2.6)
MCH RBC QN AUTO: 31 PG (ref 26.8–34.3)
MCHC RBC AUTO-ENTMCNC: 33.6 G/DL (ref 31.4–37.4)
MCV RBC AUTO: 92 FL (ref 82–98)
P AXIS: 65 DEGREES
P AXIS: 75 DEGREES
PHOSPHATE SERPL-MCNC: 3.2 MG/DL (ref 2.3–4.1)
PLATELET # BLD AUTO: 165 THOUSANDS/UL (ref 149–390)
PMV BLD AUTO: 10.5 FL (ref 8.9–12.7)
POTASSIUM SERPL-SCNC: 3.4 MMOL/L (ref 3.5–5.3)
PR INTERVAL: 128 MS
PR INTERVAL: 154 MS
PROT SERPL-MCNC: 6.6 G/DL (ref 6.4–8.2)
QRS AXIS: -3 DEGREES
QRS AXIS: -7 DEGREES
QRSD INTERVAL: 74 MS
QRSD INTERVAL: 80 MS
QT INTERVAL: 356 MS
QT INTERVAL: 374 MS
QTC INTERVAL: 491 MS
QTC INTERVAL: 492 MS
RBC # BLD AUTO: 4.35 MILLION/UL (ref 3.88–5.62)
SODIUM SERPL-SCNC: 149 MMOL/L (ref 136–145)
T WAVE AXIS: -44 DEGREES
T WAVE AXIS: 58 DEGREES
TRIGL SERPL-MCNC: 54 MG/DL
VENTRICULAR RATE: 104 BPM
VENTRICULAR RATE: 115 BPM
WBC # BLD AUTO: 10.66 THOUSAND/UL (ref 4.31–10.16)

## 2019-12-23 PROCEDURE — 84100 ASSAY OF PHOSPHORUS: CPT | Performed by: PHYSICIAN ASSISTANT

## 2019-12-23 PROCEDURE — 85027 COMPLETE CBC AUTOMATED: CPT | Performed by: PHYSICIAN ASSISTANT

## 2019-12-23 PROCEDURE — 83735 ASSAY OF MAGNESIUM: CPT | Performed by: PHYSICIAN ASSISTANT

## 2019-12-23 PROCEDURE — G8978 MOBILITY CURRENT STATUS: HCPCS

## 2019-12-23 PROCEDURE — 70551 MRI BRAIN STEM W/O DYE: CPT

## 2019-12-23 PROCEDURE — G8987 SELF CARE CURRENT STATUS: HCPCS

## 2019-12-23 PROCEDURE — 97530 THERAPEUTIC ACTIVITIES: CPT

## 2019-12-23 PROCEDURE — 93010 ELECTROCARDIOGRAM REPORT: CPT | Performed by: INTERNAL MEDICINE

## 2019-12-23 PROCEDURE — 93306 TTE W/DOPPLER COMPLETE: CPT

## 2019-12-23 PROCEDURE — 80053 COMPREHEN METABOLIC PANEL: CPT | Performed by: PHYSICIAN ASSISTANT

## 2019-12-23 PROCEDURE — 92610 EVALUATE SWALLOWING FUNCTION: CPT

## 2019-12-23 PROCEDURE — G8979 MOBILITY GOAL STATUS: HCPCS

## 2019-12-23 PROCEDURE — G8988 SELF CARE GOAL STATUS: HCPCS

## 2019-12-23 PROCEDURE — 80061 LIPID PANEL: CPT | Performed by: PHYSICIAN ASSISTANT

## 2019-12-23 PROCEDURE — 93306 TTE W/DOPPLER COMPLETE: CPT | Performed by: INTERNAL MEDICINE

## 2019-12-23 PROCEDURE — G8996 SWALLOW CURRENT STATUS: HCPCS

## 2019-12-23 PROCEDURE — 99232 SBSQ HOSP IP/OBS MODERATE 35: CPT | Performed by: PHYSICIAN ASSISTANT

## 2019-12-23 PROCEDURE — G8997 SWALLOW GOAL STATUS: HCPCS

## 2019-12-23 PROCEDURE — 97167 OT EVAL HIGH COMPLEX 60 MIN: CPT

## 2019-12-23 PROCEDURE — 83036 HEMOGLOBIN GLYCOSYLATED A1C: CPT | Performed by: PHYSICIAN ASSISTANT

## 2019-12-23 PROCEDURE — 97535 SELF CARE MNGMENT TRAINING: CPT

## 2019-12-23 PROCEDURE — 97163 PT EVAL HIGH COMPLEX 45 MIN: CPT

## 2019-12-23 RX ORDER — CEFTRIAXONE 1 G/50ML
1000 INJECTION, SOLUTION INTRAVENOUS EVERY 24 HOURS
Status: DISCONTINUED | OUTPATIENT
Start: 2019-12-23 | End: 2019-12-26

## 2019-12-23 RX ADMIN — ENOXAPARIN SODIUM 40 MG: 40 INJECTION SUBCUTANEOUS at 15:15

## 2019-12-23 RX ADMIN — SODIUM CHLORIDE 100 ML/HR: 0.9 INJECTION, SOLUTION INTRAVENOUS at 14:28

## 2019-12-23 RX ADMIN — CEFTRIAXONE 1000 MG: 1 INJECTION, SOLUTION INTRAVENOUS at 17:29

## 2019-12-23 RX ADMIN — ATORVASTATIN CALCIUM 40 MG: 40 TABLET, FILM COATED ORAL at 17:20

## 2019-12-23 RX ADMIN — SODIUM CHLORIDE 100 ML/HR: 0.9 INJECTION, SOLUTION INTRAVENOUS at 02:45

## 2019-12-23 NOTE — ASSESSMENT & PLAN NOTE
He present to the ER with complaints of generalized weakness  His wife and daughter reports that he has become very confused with slurred speech over the last few days  She also reports that he had a fall 2-3 days ago hitting his head  No LOC  CTA shows-1   No acute intracranial hemorrhage, midline shift, or mass effect  2   Chronic small vessel ischemic changes  3   If there is continued clinical concern for acute infarct, further evaluation with MRI may be obtained which is more sensitive  4   80% narrowing of the left internal carotid artery just distal to the bifurcation  5   Mild to moderate atherosclerotic narrowing of the cavernous and supraclinoid portions of the bilateral internal carotid arteries  6   Mild focal narrowing of the intracranial right vertebral artery and moderate short segment narrowing of the intracranial left vertebral artery  7   No high-grade proximal stenosis of the visualized [place]  8   No significant intracranial arteriovenous malformation or aneurysm  Continue stroke pathway  Telemetry monitoring  Will keep NPO pending swallow eval  Daily aspirin, Lipitor  Check MRI  Check echo  Check A1C, lipid panel  Monitor blood pressure closely  OT,PT speech eval  A m   Labs  Neurology consult

## 2019-12-23 NOTE — ASSESSMENT & PLAN NOTE
Patient has acute on chronic dysarthria  Improved today per patient's daughter  It's possible this is a TIA vs  Metabolic encephalopathy  CTA shows-1   No acute intracranial hemorrhage, midline shift, or mass effect  2   Chronic small vessel ischemic changes  3   If there is continued clinical concern for acute infarct, further evaluation with MRI may be obtained which is more sensitive  4   80% narrowing of the left internal carotid artery just distal to the bifurcation  5   Mild to moderate atherosclerotic narrowing of the cavernous and supraclinoid portions of the bilateral internal carotid arteries  6   Mild focal narrowing of the intracranial right vertebral artery and moderate short segment narrowing of the intracranial left vertebral artery  7   No high-grade proximal stenosis of the visualized [place]  8   No significant intracranial arteriovenous malformation or aneurysm  MRI brain:   Chronic lacunar infarct in the left centrum semiovale  No mass, mass effect, midline shift  No hemorrhage identified  No evidence of recent infarct  Chronic lacunar infarcts bilateral cerebellum  Small scattered hyperintensities   on T2/FLAIR imaging are noted in the periventricular and subcortical white matter demonstrating an appearance that is statistically most likely to represent mild microangiopathic change  Continue stroke pathway  Telemetry monitoring - no a fib noted on telemetry thus far  Will keep NPO pending swallow eval  Continue Daily aspirin, Lipitor given stenosis of L ICA  Echocardiogram - unremarkable  Check A1C, lipid panel - both well controlled  Monitor blood pressure closely - so far is controlled well  OT,PT speech eval - still pending  Spoke with Dr Irene Hull of neurology  Encouraged medical treatment with aspirin and statin and outpatient follow up with vascular surgery re: carotid stenosis

## 2019-12-23 NOTE — OCCUPATIONAL THERAPY NOTE
Occupational Therapy Evaluation     Patient Name: Acquanetta Goodell  AWRMU'D Date: 12/23/2019  Problem List  Principal Problem:    Dysarthria  Active Problems:    KURT (acute kidney injury) ([address] )    Essential hypertension    Gastroesophageal reflux disease without esophagitis    Acute cystitis    Generalized weakness    Acute metabolic encephalopathy    Hypernatremia    Past Medical History  Past Medical History:   Diagnosis Date    GERD (gastroesophageal reflux disease)     Hypertension      Past Surgical History  History reviewed  No pertinent surgical history  12/23/19 1419   Note Type   Note type Eval/Treat   Restrictions/Precautions   Weight Bearing Precautions Per Order No   Other Precautions Chair Alarm; Bed Alarm;Multiple lines;Telemetry; Fall Risk   Pain Assessment   Pain Assessment No/denies pain   Home Living   Home Equipment Walker;Cane   Additional Comments see PT evaluation for details   Prior Function   Level of [place] Needs assistance with ADLs and functional mobility; Needs assistance with IADLs   Lives With Spouse   Receives Help From Family   ADL Assistance Needs assistance   IADLs Needs assistance   Falls in the last 6 months 5 to 10   Comments pt utilizes RW at baseline during functional mobility; family performs driving   Psychosocial   Psychosocial (WDL) X   Patient Behaviors/Mood Flat affect   Subjective   Subjective "I worked for [employer]"   ADL   Where Assessed Other (Comment)  (BSC)   LB Dressing Assistance 2  Maximal Assistance   Toileting Assistance  2  Maximal Assistance   Additional Comments pt with max (A) for ADL performance at this time due to decreased ability to follow instructions and complete tasks   Bed Mobility   Supine to Sit 2  Maximal assistance   Additional items Assist x 2;LE management;Verbal cues; Bedrails   Sit to Supine   (pt seated in chair at end of session)   Additional Comments pt on RA during session; SpO2 WFL during session with no complaints of SOB; pt requires mod (A) x2 initially for sitting balance; able to maintain after 1 minute with min (A) level    Transfers   Sit to Stand 2  Maximal assistance   Additional items Assist x 2   Stand to Sit 2  Maximal assistance   Additional items Assist x 2   Stand pivot 2  Maximal assistance   Additional items Assist x 2   Toilet transfer 3  Moderate assistance   Additional items Assist x 2;Commode;Verbal cues   Additional Comments requires mod-max (A) x2 with use of RW during task; pt with inability to appropriate marlena movements and strength in regards to use of RW   Functional Mobility   Additional Comments nonambulatory at this time and unable to advance forward   Balance   Static Sitting Fair -   Dynamic Sitting Fair -   Static Standing Poor +   Dynamic Standing Poor   Activity Tolerance   Activity Tolerance Patient limited by fatigue   RUE Assessment   RUE Assessment WFL   LUE Assessment   LUE Assessment WFL   Hand Function   Gross Motor Coordination Functional   Fine Motor Coordination Functional   Sensation   Light Touch No apparent deficits   Sharp/Dull No apparent deficits   Cognition   Overall Cognitive Status Impaired   Arousal/Participation Alert   Attention Attends with cues to redirect   Orientation Level Oriented to person;Disoriented to place; Disoriented to time;Disoriented to situation   Memory Decreased long term memory;Decreased short term memory   Following Commands Follows one step commands with increased time or repetition   Assessment   Limitation Decreased ADL status; Decreased UE strength;Decreased Safe judgement during ADL;Decreased cognition;Decreased endurance;Decreased self-care trans;Decreased high-level ADLs   Assessment Pt is a 75 y o  male seen for OT evaluation s/p admit to [facility] on 12/22/2019 w/ Dysarthria  Comorbidities affecting pt's functional performance at time of assessment include: HTN, dementia and GERD   Personal factors affecting pt at time of IE include:steps to enter environment, behavioral pattern, difficulty performing ADLS, difficulty performing IADLS , limited insight into deficits, flat affect, decreased initiation and engagement  and health management   Prior to admission, pt was (A) with ADLs and IADLs with use of RW during functional mobility  Upon evaluation: Pt requires mod-max (A) x2 with use of RW during functional mobility 2* the following deficits impacting occupational performance: weakness, decreased strength, decreased balance, decreased tolerance, impaired attention, impaired initiation, impaired memory, impaired problem solving and decreased safety awareness  Pt to benefit from continued skilled OT tx while in the hospital to address deficits as defined above and maximize level of functional independence w ADL's and functional mobility  Occupational Performance areas to address include: grooming, bathing/shower, toilet hygiene, dressing, functional mobility, community mobility and clothing management  From OT standpoint, recommendation at time of d/c would be short term rehab  Goals   Patient Goals no goal stated   Short Term Goal  pt will perform UE strengthening exercises    Long Term Goal #1 pt will demonstrate UB bathing and grooming tasks seated in chair at min (A) level    Long Term Goal #2 pt will increase independence with bed mobility to min (A) x2 level    Long Term Goal pt will increase independence with functional mobility with RW to min (A) level    Plan   Treatment Interventions ADL retraining;Functional transfer training;UE strengthening/ROM; Endurance training;Equipment evaluation/education;Patient/family training;Cognitive reorientation; Activityengagement   Goal Expiration Date 01/06/20   OT Frequency 3-5x/wk   Recommendation   OT Discharge Recommendation Short Term Rehab   Barthel Index   Feeding 5   Bathing 0   Grooming Score 0   Dressing Score 5   Bladder Score 5   Bowels Score 5   Toilet Use Score 5   Transfers (Bed/Chair) Score 5   Mobility (Level Surface) Score 0   Stairs Score 0   Barthel Index Score 30     Pt will benefit from continued OT services in order to maximize (I) c ADL performance, FM c RW, and improve overall endurance/strength required to complete functional tasks in preparation for d/c  Pt left seated in chair at end of session; all needs within reach; all lines intact; scds connected and turned on

## 2019-12-23 NOTE — PHYSICAL THERAPY NOTE
Physical Therapy Evaluation    Patient Name: Monique Mcdaniel    RPIBN'Q Date: 12/23/2019     Problem List  Principal Problem:    Dysarthria  Active Problems:    KUTR (acute kidney injury) ([address] )    Essential hypertension    Gastroesophageal reflux disease without esophagitis    Acute cystitis    Generalized weakness    Acute metabolic encephalopathy    Hypernatremia       Past Medical History  Past Medical History:   Diagnosis Date    GERD (gastroesophageal reflux disease)     Hypertension         Past Surgical History  History reviewed  No pertinent surgical history  12/23/19 1420   Note Type   Note type Eval/Treat   Pain Assessment   Pain Assessment No/denies pain   Pain Score No Pain   Home Living   Type of Home House   Home Layout Two level;Performs ADLs on one level; Able to live on main level with bedroom/bathroom;Stairs to enter with rails  (pt lives on top floor of bi-level home; 5 + 5 IFRAH c HR)   Bathroom Shower/Tub Walk-in shower   [address] chair;Built-in [address] Walker;Cane;Wheelchair-manual   Prior Function   Level of [place] Needs assistance with IADLs; Needs assistance with ADLs and functional mobility   Lives With Spouse; Family   Receives Help From Family   ADL Assistance Needs assistance   IADLs Needs assistance   Falls in the last 6 months 5 to 10   Vocational Retired   Comments pt uses RW at baseline; family provides transportation   Restrictions/Precautions   [place] Bearing Precautions Per Order No   Other Precautions Fall Risk;Telemetry;Multiple lines; Chair Alarm; Bed Alarm;Cognitive   General   Family/Caregiver Present Yes  (Daughter)   Cognition   Overall Cognitive Status Impaired   Arousal/Participation Cooperative   Attention Attends with cues to redirect   Orientation Level Oriented to person;Disoriented to place; Disoriented to time;Disoriented to situation   Following Commands Follows one step commands with increased time or repetition   RLE Assessment   RLE Assessment X  (pt unable to follow VCs to perform MMTs)   LLE Assessment   LLE Assessment X  (pt unable to follow VCs to perform MMTs)   Coordination   Movements are Fluid and Coordinated 0   Coordination and Movement Description movement very rigid and uncoordinated   Bed Mobility   Supine to Sit 2  Maximal assistance   Additional items Assist x 2; Increased time required; Impulsive;LE management;Verbal cues   Sit to Supine   (pt seated in recliner at end of session)   Transfers   Sit to Stand 2  Maximal assistance   Additional items Assist x 2; Increased time required;Verbal cues   Stand to Sit 2  Maximal assistance   Additional items Assist x 2; Increased time required;Verbal cues   Stand pivot 2  Maximal assistance   Additional items Assist x 2; Increased time required;Verbal cues   Toilet transfer 3  Moderate assistance   Additional items Commode;Assist x 2;Verbal cues; Increased time required  (commode raised to highest setting)   Additional Comments RW used for all transfers   Ambulation/Elevation   Gait pattern Not appropriate; Not tested   Balance   Static Sitting Fair -   Dynamic Sitting Fair -   Static Standing Poor   Dynamic Standing Poor   Endurance Deficit   Endurance Deficit Yes   Endurance Deficit Description pt fatigued with STS transfers   Activity Tolerance   Activity Tolerance Patient limited by fatigue   Nurse Made Aware Yes   Assessment   Prognosis Guarded   Problem List Decreased strength;Decreased endurance; Impaired balance;Decreased mobility; Decreased cognition;Decreased safety awareness; Impaired judgement;Decreased coordination   Assessment Patient is a 75 y o  male evaluated by Physical Therapy s/p admit to [address] [facility] [facility] on 12/22/2019 with admitting diagnosis of: Slurred speech, UTI (urinary tract infection), Speech disturbance, Renal insufficiency, Carotid stenosis, left, Ambulatory dysfunction, and principal problem of: Stroke-like symptoms  PT was consulted to assess patient's functional mobility and discharge needs  Ordered are PT Evaluation and treatment with activity level of: up with assistance  Comorbidities affecting patient's physical performance at time of assessment include: GERD, acute cystitis, HTN, KURT  Personal factors affecting the patient at time of IE include: lives in 2 story home, ambulating with assistive device, step(s) to enter home, communication issues, history of fall(s), impaired safety awareness, decreased initiation and engagement, inability/difficulty performing IADLs and inability/difficulty performing ADLs  Please locate objective findings from PT assessment regarding body systems outlined above  Upon evaluation, pt requiring mod to maxA x 2 and maximum verbal, tactile, and visual cuing to perform all functional mobility  SpO2 and HR remained WFL throughout session  Pt very rigid both at rest and with mobility, which significantly impacts his functional abilities  Pt having difficulties following one-step commands, however with hand over hand assistance, able to demonstrate carryover  Pt's daughter states he has been falling more recently at home but has never required this much assistance  Pt will require continued PT intervention during LOS to address current deficits, increase LOF, and facilitate safe d/c to next level of care when medically appropriate  D/c recommendation at this time is short term skilled PT  Goals   Patient Goals to go to rehab   Short Term Goal #1 Pt will participate in B LE strengthening exercises to facilitate improved functional mobility  LTG Expiration Date 01/06/20   Long Term Goal #1 Pt will perform all functional transfers and bed mobility with Helen x 2 with decreased need for verbal cuing  Long Term Goal #2 Pt will ambulate 20' with RW and Helen x 2 with functional dynamic balance     Plan   Treatment/Interventions Functional transfer training;LE strengthening/ROM; Elevations; Therapeutic exercise; Endurance training;Bed mobility;Gait training   PT Frequency Other (Comment)  (3-5x/week)   Recommendation   Recommendation Short-term skilled PT     Pt seated in recliner at end of session with B SCDs applied and all needs in reach

## 2019-12-23 NOTE — ASSESSMENT & PLAN NOTE
Creatinine at 1 65 on admission, now back to normal at 1 01  No documented hx of CKD  Avoid nephrotoxic agents  Follow BMP daily

## 2019-12-23 NOTE — ASSESSMENT & PLAN NOTE
Patient reports urinary retention and suprapubic pain  He is also noted to have generalized weakness and confusion and a recent fall  UA shows-4-10 WBCs, innumerable bacteria, moderate blood  Leukocytosis also present  CT abdomen and pelvis shows- 4   Marked bladder distention  Started on IV ceftriaxone in the ER, will continue for now  Urine cultures blood cultures pending  [place] catheter initially placed, will discontinue and attempt voiding trial  Follow urinary retention protocol  Monitor urinary output, renal function  A m   Labs  Supportive care

## 2019-12-23 NOTE — ASSESSMENT & PLAN NOTE
Likely multifactorial but especially in the setting of acute UTI  Will likely require short-term rehab on discharge

## 2019-12-23 NOTE — PLAN OF CARE
Problem: Prexisting or High Potential for Compromised Skin Integrity  Goal: Skin integrity is maintained or improved  Description  INTERVENTIONS:  - Identify patients at risk for skin breakdown  - Assess and monitor skin integrity  - Assess and monitor nutrition and hydration status  - Monitor labs   - Assess for incontinence   - Turn and reposition patient  - Assist with mobility/ambulation  - Relieve pressure over bony prominences  - Avoid friction and shearing  - Provide appropriate hygiene as needed including keeping skin clean and dry  - Evaluate need for skin moisturizer/barrier cream  - Collaborate with interdisciplinary team   - Patient/family teaching  - Consider wound care consult   Outcome: Progressing     Problem: Potential for Falls  Goal: Patient will remain free of falls  Description  INTERVENTIONS:  - Assess patient frequently for physical needs  -  Identify cognitive and physical deficits and behaviors that affect risk of falls    -  [place] fall precautions as indicated by assessment   - Educate patient/family on patient safety including physical limitations  - Instruct patient to call for assistance with activity based on assessment  - Modify environment to reduce risk of injury  - Consider OT/PT consult to assist with strengthening/mobility  Outcome: Progressing     Problem: PAIN - ADULT  Goal: Verbalizes/displays adequate comfort level or baseline comfort level  Description  Interventions:  - Encourage patient to monitor pain and request assistance  - Assess pain using appropriate pain scale  - Administer analgesics based on type and severity of pain and evaluate response  - Implement non-pharmacological measures as appropriate and evaluate response  - Consider cultural and social influences on pain and pain management  - Notify physician/advanced practitioner if interventions unsuccessful or patient reports new pain  Outcome: Progressing     Problem: INFECTION - ADULT  Goal: Absence or prevention of progression during hospitalization  Description  INTERVENTIONS:  - Assess and monitor for signs and symptoms of infection  - Monitor lab/diagnostic results  - Monitor all insertion sites, i e  indwelling lines, tubes, and drains  - [place] appropriate cooling/warming therapies per order  - Administer medications as ordered  - Instruct and encourage patient and family to use good hand hygiene technique  - Identify and instruct in appropriate isolation precautions for identified infection/condition   Outcome: Progressing     Problem: SAFETY ADULT  Goal: Maintain or return to baseline ADL function  Description  INTERVENTIONS:  -  Assess patient's ability to carry out ADLs; assess patient's baseline for ADL function and identify physical deficits which impact ability to perform ADLs (bathing, care of mouth/teeth, toileting, grooming, dressing, etc )  - Assess/evaluate cause of self-care deficits   - Assess range of motion  - Assess patient's mobility; develop plan if impaired  - Assess patient's need for assistive devices and provide as appropriate  - Encourage maximum independence but intervene and supervise when necessary  - Involve family in performance of ADLs  - Assess for home care needs following discharge   - Consider OT consult to assist with ADL evaluation and planning for discharge  - Provide patient education as appropriate  Outcome: Progressing  Goal: Maintain or return mobility status to optimal level  Description  INTERVENTIONS:  - Assess patient's baseline mobility status (ambulation, transfers, stairs, etc )    - Identify cognitive and physical deficits and behaviors that affect mobility  - Identify mobility aids required to assist with transfers and/or ambulation (gait belt, sit-to-stand, lift, walker, cane, etc )  - [place] fall precautions as indicated by assessment  - Record patient progress and toleration of activity level on Mobility SBAR; progress patient to next Phase/Stage  - Instruct patient to call for assistance with activity based on assessment  - Consider rehabilitation consult to assist with strengthening/weightbearing, etc   Outcome: Progressing

## 2019-12-23 NOTE — ASSESSMENT & PLAN NOTE
Patient's daughter notes transient confusion  Suspect this is metabolic encephalopathy in the setting of acute UTI  Will continue IV fluid hydration and IV antibiotics

## 2019-12-23 NOTE — PROGRESS NOTES
Progress Note - Jolynn Perea 1945, 75 y o  male MRN: [id number]    Unit/Bed#: 426-01 Encounter: [id number]    Primary Care Provider: No primary care provider on file  Date and time admitted to hospital: 12/22/2019  4:16 PM        Acute metabolic encephalopathy  Assessment & Plan  Patient's daughter notes transient confusion  Suspect this is metabolic encephalopathy in the setting of acute UTI  Will continue IV fluid hydration and IV antibiotics  Generalized weakness  Assessment & Plan  Likely multifactorial but especially in the setting of acute UTI  Will likely require short-term rehab on discharge  * Dysarthria  Assessment & Plan  Patient has acute on chronic dysarthria  Improved today per patient's daughter  It's possible this is a TIA vs  Metabolic encephalopathy  CTA shows-1   No acute intracranial hemorrhage, midline shift, or mass effect  2   Chronic small vessel ischemic changes  3   If there is continued clinical concern for acute infarct, further evaluation with MRI may be obtained which is more sensitive  4   80% narrowing of the left internal carotid artery just distal to the bifurcation  5   Mild to moderate atherosclerotic narrowing of the cavernous and supraclinoid portions of the bilateral internal carotid arteries  6   Mild focal narrowing of the intracranial right vertebral artery and moderate short segment narrowing of the intracranial left vertebral artery  7   No high-grade proximal stenosis of the visualized [place]  8   No significant intracranial arteriovenous malformation or aneurysm  MRI brain:   Chronic lacunar infarct in the left centrum semiovale  No mass, mass effect, midline shift  No hemorrhage identified  No evidence of recent infarct  Chronic lacunar infarcts bilateral cerebellum    Small scattered hyperintensities   on T2/FLAIR imaging are noted in the periventricular and subcortical white matter demonstrating an appearance that is statistically most likely to represent mild microangiopathic change  Continue stroke pathway  Telemetry monitoring - no a fib noted on telemetry thus far  Will keep NPO pending swallow eval  Continue Daily aspirin, Lipitor given stenosis of L ICA  Echocardiogram - unremarkable  Check A1C, lipid panel - both well controlled  Monitor blood pressure closely - so far is controlled well  OT,PT speech eval - still pending  Spoke with Dr Vane Hernandez of neurology  Encouraged medical treatment with aspirin and statin and outpatient follow up with vascular surgery re: carotid stenosis  Acute cystitis  Assessment & Plan  Patient reports urinary retention and suprapubic pain  He is also noted to have generalized weakness and confusion and a recent fall  UA shows-4-10 WBCs, innumerable bacteria, moderate blood  Leukocytosis also present  CT abdomen and pelvis shows- 4   Marked bladder distention  Started on IV ceftriaxone in the ER, will continue for now  Urine cultures blood cultures pending  [place] catheter initially placed, will discontinue and attempt voiding trial  Follow urinary retention protocol  Monitor urinary output, renal function  A m  Labs  Supportive care      KURT (acute kidney injury) ([facility] [address] )  Assessment & Plan  Creatinine at 1 65 on admission, now back to normal at 1 01  No documented hx of CKD  Avoid nephrotoxic agents  Follow BMP daily  Hypernatremia  Assessment & Plan  Likely in the setting of volume depletion / NPO status,  Will continue IVF hydration with IV normal saline at 100mL/hr  Gastroesophageal reflux disease without esophagitis  Assessment & Plan  He is currently on Prilosec for 20 mg p o  Daily  Will give formulary alternative Protonix 40 mg p o   Daily    Essential hypertension  Assessment & Plan  Blood pressure is stable  Continue PTA medciations        VTE Pharmacologic Prophylaxis:   Pharmacologic: Enoxaparin (Lovenox)  Mechanical VTE Prophylaxis in Place: Yes    Patient Centered Rounds: I have performed bedside rounds with nursing staff today  Discussions with Specialists or Other Care Team Provider: discussed with neurology on call, Dr Israel Ventura     Education and Discussions with Family / Patient: discussed plan of care with patient's daughter at bedside  Time Spent for Care: 30 minutes  More than 50% of total time spent on counseling and coordination of care as described above  Current Length of Stay: 1 day(s)    Current Patient Status: Inpatient   Certification Statement: The patient will continue to require additional inpatient hospital stay due to need for close monitoring with labs, IV fluid hydration  Discharge Plan / Estimated Discharge Date: TBD      Code Status: Level 1 - Full Code      Subjective:   Patient denies any complaints  Daughter confirms he fell at home and has been very weak since that time  He also noted urinary retention, decreased appetite prior to arrival  Overall he seems improved per daughter's assessment today  Objective:   Vitals:   Temp (24hrs), Av 9 °F (37 2 °C), Min:97 9 °F (36 6 °C), Max:100 5 °F (38 1 °C)    Temp:  [97 9 °F (36 6 °C)-100 5 °F (38 1 °C)] 97 9 °F (36 6 °C)  HR:  [63-96] 89  Resp:  [14-18] 18  BP: (107-157)/(58-90) 119/71  SpO2:  [94 %-99 %] 96 %  Body mass index is 21 95 kg/m²  Input and Output Summary (last 24 hours): Intake/Output Summary (Last 24 hours) at 2019 1439  Last data filed at 2019 1428  Gross per 24 hour   Intake 2780 ml   Output 3050 ml   Net -270 ml       Physical Exam:   Physical Exam   Constitutional: He appears well-developed and well-nourished  HENT:   Head: Normocephalic  Mouth/Throat: Oropharynx is clear and moist    Neck: Neck supple  Cardiovascular: Normal rate, normal heart sounds and intact distal pulses  No murmur heard  Pulmonary/Chest: Effort normal and breath sounds normal  No respiratory distress  He has no wheezes  Abdominal: Soft  Bowel sounds are normal  There is no tenderness  Musculoskeletal: He exhibits no edema  Neurological: He is alert  No cranial nerve deficit  Coordination normal    Slurred speech noted at times  Generalized decreased strength throughout all 4 extremities with slow movements  Skin: Skin is warm and dry  Psychiatric: He has a normal mood and affect  Thought content normal    Nursing note and vitals reviewed  Additional Data:   Labs:    Results from last 7 days   Lab Units 12/23/19  0517 12/22/19  1629   WBC Thousand/uL 10 66* 14 44*   HEMOGLOBIN g/dL 13 5 15 7   I STAT HEMOGLOBIN g/dl  --  15 6   HEMATOCRIT % 40 2 46 4   HEMATOCRIT, ISTAT %  --  46   PLATELETS Thousands/uL 165 189   NEUTROS PCT %  --  85*   LYMPHS PCT %  --  4*   MONOS PCT %  --  11   EOS PCT %  --  0     Results from last 7 days   Lab Units 12/23/19  0517 12/22/19  1629   POTASSIUM mmol/L 3 4* 3 7   CHLORIDE mmol/L 112* 105   CO2 mmol/L 25 26   CO2, I-STAT mmol/L  --  26   BUN mg/dL 33* 49*   CREATININE mg/dL 1 01 1 68*   CALCIUM mg/dL 8 8 9 6   ALK PHOS U/L 76 91   ALT U/L 45 51   AST U/L 34 33   GLUCOSE, ISTAT mg/dl  --  134     Results from last 7 days   Lab Units 12/22/19  1629   INR  1 21*       * I Have Reviewed All Lab Data Listed Above  * Additional Pertinent Lab Tests Reviewed: All Labs Within Last 24 Hours Reviewed    Imaging:  Imaging Reports Reviewed Today Include: reviewed MRI brain  Recent Cultures (last 7 days):     Results from last 7 days   Lab Units 12/22/19  1905 12/22/19  1840   BLOOD CULTURE  Received in Microbiology Lab  Culture in Progress  Received in Microbiology Lab  Culture in Progress         Last 24 Hours Medication List:     Current Facility-Administered Medications:  aspirin 81 mg Oral Daily Jamal Anthony PA-C    atorvastatin 40 mg Oral QPM Jamal Anthony PA-C    iohexol 100 mL Intravenous Once in imaging Jamal Anthony PA-C    iohexol 70 mL Intravenous Once in imaging Jamal Anthony PA-C    ondansetron 4 mg Intravenous Q6H PRN Jamal Anthony PA-C    sodium chloride 100 mL/hr Intravenous Continuous Jamal Anthony PA-C Last Rate: 100 mL/hr (12/23/19 1428)        Today, Patient Was Seen By: Nori Helms PA-C    ** Please Note: Dragon 360 Dictation voice to text software may have been used in the creation of this document   **

## 2019-12-23 NOTE — PLAN OF CARE
Problem: Prexisting or High Potential for Compromised Skin Integrity  Goal: Skin integrity is maintained or improved  Description  INTERVENTIONS:  - Identify patients at risk for skin breakdown  - Assess and monitor skin integrity  - Assess and monitor nutrition and hydration status  - Monitor labs   - Assess for incontinence   - Turn and reposition patient  - Assist with mobility/ambulation  - Relieve pressure over bony prominences  - Avoid friction and shearing  - Provide appropriate hygiene as needed including keeping skin clean and dry  - Evaluate need for skin moisturizer/barrier cream  - Collaborate with interdisciplinary team   - Patient/family teaching  - Consider wound care consult   Outcome: Progressing     Problem: Potential for Falls  Goal: Patient will remain free of falls  Description  INTERVENTIONS:  - Assess patient frequently for physical needs  -  Identify cognitive and physical deficits and behaviors that affect risk of falls    -  [place] fall precautions as indicated by assessment   - Educate patient/family on patient safety including physical limitations  - Instruct patient to call for assistance with activity based on assessment  - Modify environment to reduce risk of injury  - Consider OT/PT consult to assist with strengthening/mobility  Outcome: Progressing     Problem: PAIN - ADULT  Goal: Verbalizes/displays adequate comfort level or baseline comfort level  Description  Interventions:  - Encourage patient to monitor pain and request assistance  - Assess pain using appropriate pain scale  - Administer analgesics based on type and severity of pain and evaluate response  - Implement non-pharmacological measures as appropriate and evaluate response  - Consider cultural and social influences on pain and pain management  - Notify physician/advanced practitioner if interventions unsuccessful or patient reports new pain  Outcome: Progressing     Problem: INFECTION - ADULT  Goal: Absence or prevention of progression during hospitalization  Description  INTERVENTIONS:  - Assess and monitor for signs and symptoms of infection  - Monitor lab/diagnostic results  - Monitor all insertion sites, i e  indwelling lines, tubes, and drains  - [place] appropriate cooling/warming therapies per order  - Administer medications as ordered  - Instruct and encourage patient and family to use good hand hygiene technique  - Identify and instruct in appropriate isolation precautions for identified infection/condition   Outcome: Progressing     Problem: SAFETY ADULT  Goal: Maintain or return to baseline ADL function  Description  INTERVENTIONS:  -  Assess patient's ability to carry out ADLs; assess patient's baseline for ADL function and identify physical deficits which impact ability to perform ADLs (bathing, care of mouth/teeth, toileting, grooming, dressing, etc )  - Assess/evaluate cause of self-care deficits   - Assess range of motion  - Assess patient's mobility; develop plan if impaired  - Assess patient's need for assistive devices and provide as appropriate  - Encourage maximum independence but intervene and supervise when necessary  - Involve family in performance of ADLs  - Assess for home care needs following discharge   - Consider OT consult to assist with ADL evaluation and planning for discharge  - Provide patient education as appropriate  Outcome: Progressing  Goal: Maintain or return mobility status to optimal level  Description  INTERVENTIONS:  - Assess patient's baseline mobility status (ambulation, transfers, stairs, etc )    - Identify cognitive and physical deficits and behaviors that affect mobility  - Identify mobility aids required to assist with transfers and/or ambulation (gait belt, sit-to-stand, lift, walker, cane, etc )  - [place] fall precautions as indicated by assessment  - Record patient progress and toleration of activity level on Mobility SBAR; progress patient to next Phase/Stage  - Instruct patient to call for assistance with activity based on assessment  - Consider rehabilitation consult to assist with strengthening/weightbearing, etc   Outcome: Progressing     Problem: DISCHARGE PLANNING  Goal: Discharge to home or other facility with appropriate resources  Description  INTERVENTIONS:  - Identify barriers to discharge w/patient and caregiver  - Arrange for needed discharge resources and transportation as appropriate  - Identify discharge learning needs (meds, wound care, etc )  - Arrange for interpretive services to assist at discharge as needed  - Refer to Case Management Department for coordinating discharge planning if the patient needs post-hospital services based on physician/advanced practitioner order or complex needs related to functional status, cognitive ability, or social support system  Outcome: Progressing     Problem: Knowledge Deficit  Goal: Patient/family/caregiver demonstrates understanding of disease process, treatment plan, medications, and discharge instructions  Description  Complete learning assessment and assess knowledge base  Interventions:  - Provide teaching at level of understanding  - Provide teaching via preferred learning methods  Outcome: Progressing     Problem: Neurological Deficit  Goal: Neurological status is stable or improving  Description  Interventions:  - Monitor and assess patient's level of consciousness, motor function, sensory function, and level of assistance needed for ADLs  - Monitor and report changes from baseline  Collaborate with interdisciplinary team to initiate plan and implement interventions as ordered  - Provide and maintain a safe environment  - Consider seizure precautions  - Consider fall precautions  - Consider aspiration precautions  - Consider bleeding precautions  Outcome: Progressing     Problem:  Activity Intolerance/Impaired Mobility  Goal: Mobility/activity is maintained at optimum level for patient  Description  Interventions:  - Assess and monitor patient  barriers to mobility and need for assistive/adaptive devices  - Assess patient's emotional response to limitations  - Collaborate with interdisciplinary team and initiate plans and interventions as ordered  - Encourage independent activity per ability   - Maintain proper body alignment  - Perform active/passive rom as tolerated/ordered  - Plan activities to conserve energy   - Turn patient as appropriate  Outcome: Progressing     Problem: Communication Impairment  Goal: Ability to express needs and understand communication  Description  Assess patient's communication skills and ability to understand information  Patient will demonstrate use of effective communication techniques, alternative methods of communication and understanding even if not able to speak  - Encourage communication and provide alternate methods of communication as needed  - Collaborate with case management/ for discharge needs  - Include patient/family/caregiver in decisions related to communication  Outcome: Progressing     Problem: Potential for Aspiration  Goal: Non-ventilated patient's risk of aspiration is minimized  Description  Assess and monitor vital signs, respiratory status, and labs (WBC)  Monitor for signs of aspiration (tachypnea, cough, rales, wheezing, cyanosis, fever)  - Assess and monitor patient's ability to swallow  - Place patient up in chair to eat if possible  - HOB up at 90 degrees to eat if unable to get patient up into chair   - Supervise patient during oral intake  - Instruct patient/ family to take small bites  - Instruct patient/ family to take small single sips when taking liquids    - Follow patient-specific strategies generated by speech pathologist   Outcome: Progressing     Problem: Nutrition  Goal: Nutrition/Hydration status is improving  Description  Monitor and assess patient's nutrition/hydration status for malnutrition (ex- brittle hair, bruises, dry skin, pale skin and conjunctiva, muscle wasting, smooth red tongue, and disorientation)  Collaborate with interdisciplinary team and initiate plan and interventions as ordered  Monitor patient's weight and dietary intake as ordered or per policy  Utilize nutrition screening tool and intervene per policy  Determine patient's food preferences and provide high-protein, high-caloric foods as appropriate  - Collaborate with clinical nutritionist  (NPO status)  - Include patient/family/caregiver in decisions related to nutrition  Outcome: Progressing     Problem: Nutrition/Hydration-ADULT  Goal: Nutrient/Hydration intake appropriate for improving, restoring or maintaining nutritional needs  Description  Monitor and assess patient's nutrition/hydration status for malnutrition  Collaborate with interdisciplinary team and initiate plan and interventions as ordered  Monitor patient's weight and dietary intake as ordered or per policy  Utilize nutrition screening tool and intervene as necessary  Determine patient's food preferences and provide high-protein, high-caloric foods as appropriate       INTERVENTIONS:  - Monitor urinary output, labs, and treatment plans  - Assess nutrition and hydration status and recommend course of action  - Recommend/ encourage appropriate diets, oral nutritional supplements, and vitamin/mineral supplements  - Assess need for intravenous fluids  - Provide specific nutrition/hydration education as appropriate(NPO)  - Include patient/family/caregiver in decisions related to nutrition  Outcome: Progressing

## 2019-12-23 NOTE — ED NOTES
Kayleigh from med surg aware that the patient is confused and will relay that information to the floor nurse        Arianna Maldonado RN  12/22/19 1911

## 2019-12-23 NOTE — PLAN OF CARE
Problem: OCCUPATIONAL THERAPY ADULT  Goal: Performs self-care activities at highest level of function for planned discharge setting  See evaluation for individualized goals  Description  Treatment Interventions: ADL retraining, Functional transfer training, UE strengthening/ROM, Endurance training, Equipment evaluation/education, Patient/family training, Cognitive reorientation, Activityengagement          See flowsheet documentation for full assessment, interventions and recommendations  Note:   Limitation: Decreased ADL status, Decreased UE strength, Decreased Safe judgement during ADL, Decreased cognition, Decreased endurance, Decreased self-care trans, Decreased high-level ADLs     Assessment: Pt is a 75 y o  male seen for OT evaluation s/p admit to [facility] on 12/22/2019 w/ Dysarthria  Comorbidities affecting pt's functional performance at time of assessment include: HTN, dementia and GERD  Personal factors affecting pt at time of IE include:steps to enter environment, behavioral pattern, difficulty performing ADLS, difficulty performing IADLS , limited insight into deficits, flat affect, decreased initiation and engagement  and health management   Prior to admission, pt was (A) with ADLs and IADLs with use of RW during functional mobility  Upon evaluation: Pt requires mod-max (A) x2 with use of RW during functional mobility 2* the following deficits impacting occupational performance: weakness, decreased strength, decreased balance, decreased tolerance, impaired attention, impaired initiation, impaired memory, impaired problem solving and decreased safety awareness  Pt to benefit from continued skilled OT tx while in the hospital to address deficits as defined above and maximize level of functional independence w ADL's and functional mobility   Occupational Performance areas to address include: grooming, bathing/shower, toilet hygiene, dressing, functional mobility, community mobility and clothing management  From OT standpoint, recommendation at time of d/c would be short term rehab       OT Discharge Recommendation: Short Term Rehab

## 2019-12-23 NOTE — ASSESSMENT & PLAN NOTE
Likely in the setting of volume depletion / NPO status,  Will continue IVF hydration with IV normal saline at 100mL/hr

## 2019-12-23 NOTE — SOCIAL WORK
Met with pt's daughter  Reed Prado ([phone]) via telephone to discuss role as  in helping pt to develop discharge plan and to help pt carry out their plan  Pt and his wife have an apartment in 2nd floor of a house and his brother his son has an apartment on the 1rst floor of same house  Pt has 6+6 IFRAH   Pt has everything set up on 2nd floor  Pt has a cane , walker, shower seat , wheel chair and a lift chair at home  Pt uses the Walker to get around  Pt needs some help with dressing and his wife helps as needed   Pt's wife helps with the cooking and cleaning  Pt's wife and daughter drives him to [employer]  Pt has never had home care or any services in the home   Pt's PCP is Dr Blank Ng at the [facility]  Pt uses the [facility]  Pt was given a discharge check list and Meds to [facility]  Both reviewed with a good understanding  Pt might benefit from STR vs home care will continue to follow

## 2019-12-23 NOTE — PLAN OF CARE
Problem: PHYSICAL THERAPY ADULT  Goal: Performs mobility at highest level of function for planned discharge setting  See evaluation for individualized goals  Description  Treatment/Interventions: Functional transfer training, LE strengthening/ROM, Elevations, Therapeutic exercise, Endurance training, Bed mobility, Gait training          See flowsheet documentation for full assessment, interventions and recommendations  Note:   Prognosis: Guarded  Problem List: Decreased strength, Decreased endurance, Impaired balance, Decreased mobility, Decreased cognition, Decreased safety awareness, Impaired judgement, Decreased coordination  Assessment: Patient is a 75 y o  male evaluated by Physical Therapy s/p admit to [address] [place] [address],[address] on 12/22/2019 with admitting diagnosis of: Slurred speech, UTI (urinary tract infection), Speech disturbance, Renal insufficiency, Carotid stenosis, left, Ambulatory dysfunction, and principal problem of: Stroke-like symptoms  PT was consulted to assess patient's functional mobility and discharge needs  Ordered are PT Evaluation and treatment with activity level of: up with assistance  Comorbidities affecting patient's physical performance at time of assessment include: GERD, acute cystitis, HTN, KURT  Personal factors affecting the patient at time of IE include: lives in 2 story home, ambulating with assistive device, step(s) to enter home, communication issues, history of fall(s), impaired safety awareness, decreased initiation and engagement, inability/difficulty performing IADLs and inability/difficulty performing ADLs  Please locate objective findings from PT assessment regarding body systems outlined above  Upon evaluation, pt requiring mod to maxA x 2 and maximum verbal, tactile, and visual cuing to perform all functional mobility  SpO2 and HR remained WFL throughout session  Pt very rigid both at rest and with mobility, which significantly impacts his functional abilities  Pt having difficulties following one-step commands, however with hand over hand assistance, able to demonstrate carryover  Pt's daughter states he has been falling more recently at home but has never required this much assistance  Pt will require continued PT intervention during LOS to address current deficits, increase LOF, and facilitate safe d/c to next level of care when medically appropriate  D/c recommendation at this time is short term skilled PT  Recommendation: Short-term skilled PT          See flowsheet documentation for full assessment

## 2019-12-23 NOTE — SPEECH THERAPY NOTE
Speech-Language Pathology Bedside Swallow Evaluation    Patient Name: Arcadio Boyd    YUZXY'C Date: 12/23/2019     Problem List  Principal Problem:    Dysarthria  Active Problems:    KURT (acute kidney injury) ([address] )    Essential hypertension    Gastroesophageal reflux disease without esophagitis    Acute cystitis    Generalized weakness    Acute metabolic encephalopathy    Hypernatremia      Past Medical History  Past Medical History:   Diagnosis Date    GERD (gastroesophageal reflux disease)     Hypertension        Past Surgical History  History reviewed  No pertinent surgical history  Summary   Pt presented with s/s suggestive of moderate oral and suspected moderate pharyngeal dysphagia  Symptoms or concerns included decreased bolus propulsion, decreased mastication and suspected decreased control of thin liquids as well as suspected pharyngeal swallow delay, suspected decreased hyolaryngeal elevation upon palpation and multiple swallows  Mild throat clearing and coughing noted with trials of thin liquid via tsp and cup sip  No s/s aspiration occurred with NTL trials  Risk/s for Aspiration: mod with thin liquids    Recommended Diet: mechanically altered/level 2 diet and nectar thick liquids   Recommended Form of Meds: crushed with puree   Aspiration precautions and swallowing strategies: upright posture, only feed when fully alert, slow rate of feeding, small bites/sips and alternating bites and sips      Current Medical Status per EVELIA Anthony's H&P 12/22/2019  Arcadio Boyd is a 75 y o  male who presents to the emergency room with complaints of generalized weakness and slurred speech and increased confusion over last 2-3 days getting progressively worse  His family reports that he has been having several falls over the past couple of weeks most recent was about 3 days ago hitting his head however he had no LOC    His wife also reports that he had increased weakness to his lower extremities decrease in his ability to ambulate as well as he has not been eating or drinking very much over the last few days  His past medical history significant for GERD and hypertension  His family also reports that he has been evaluated at the [facility] for possible dementia, Alzheimer's, Parkinson's  He also reports that he had several CT scans, MRI as well as EEG  He was also prescribed galantamine which was discontinued by his family as they thought that it was contributing to his complaints of headaches  Labs completed in the ER with results as shown below  CTA head and neck, CT chest abdomen and pelvis, and CT cervical spine completed with results shown below  He received normal saline bolus 500 cc in the ER  He was also started on ceftriaxone 1 g IV  At bedside he is awake obviously confused  He does not formulate any specific complaints  The patient has been admitted on inpatient status [facility] level care for further workup and management stroke-like symptoms, acute cystitis, acute kidney injury    Current Precautions:  Fall  Aspiration  Contact  C diff  Droplet    Past medical history:  Please see H&P for details    Special Studies:  MRI brain 12/23/2019 IMPRESSION:  No acute intracranial abnormality  Chronic lacunar infarct left centrum semiovale  Social/Education/Vocational Hx:  Pt lives with wife    Swallow Information   Current Risks for Dysphagia & Aspiration: AMS  Current Symptoms/Concerns: slurred speech  Current Diet: NPO   Baseline Diet: regular and thin per daughter he avoided tough meats      Baseline Assessment   Behavior/Cognition: decreased attention  Speech/Language Status: able to follow commands inconsistently and limited verbal output  Patient Positioning: upright in chair  Pain Status/Interventions/Response to Interventions:  No report of or nonverbal indications of pain         Swallow Mechanism Exam  Facial: symmetrical  Labial: bilateral decreased retraction and suspect reduced strength  Lingual:decreased coordination and lateralization both R and L   Velum: unable to visualize  Mandible:  decreased ROM  Dentition: upper dentures  Vocal quality:weak   Volitional Cough: weak       Consistencies Assessed and Performance   Consistencies Administered: ice chips, thin liquids, nectar thick, puree and soft solids  Materials administered included ice, water, apple sauce, sandwich with crust removed    Oral Stage: moderate  Mastication was prolonged and inadequate with the solid materials administered today  Bolus formation and transfer were reduced with mild-mod oral residue noted  Suspect reduced control of liquids  Pharyngeal Stage: moderate  Swallow Mechanics:  Swallowing initiation appeared mildly delayed  Laryngeal rise was palpated and judged to be reduced  Cough noted with trials of thin liquid via cup and tsp  No s/s aspiration with NTL via cup or straw  Esophageal Concerns: hx of GERD    Summary and Recommendations (see above)    Results Reviewed with: patient, RN and family     Caregiver Education: initiated  Treatment Recommended: ST f/u for dysphagia    Frequency of treatment: 3-5x      Dysphagia LTG per SLP  -Patient will demonstrate optimum safety and efficacy of oral intake and swallowing function without overt s/sx of penetration or aspiration for the highest appropriate diet level       Short Term Goals:  -Pt will tolerate Dysphagia 1/pureed diet and nectar thick thin liquid with no significant s/s oral or pharyngeal dysphagia across 1-3 diagnostic sessions    -Patient will tolerate trials of upgraded food and/or liquid texture with no significant s/s of oral or pharyngeal dysphagia including aspiration across 1-3 diagnostic sessions

## 2019-12-24 LAB
ANION GAP SERPL CALCULATED.3IONS-SCNC: 11 MMOL/L (ref 4–13)
BASOPHILS # BLD AUTO: 0.02 THOUSANDS/ΜL (ref 0–0.1)
BASOPHILS NFR BLD AUTO: 0 % (ref 0–1)
BUN SERPL-MCNC: 25 MG/DL (ref 5–25)
CALCIUM SERPL-MCNC: 8.8 MG/DL (ref 8.3–10.1)
CHLORIDE SERPL-SCNC: 114 MMOL/L (ref 100–108)
CO2 SERPL-SCNC: 23 MMOL/L (ref 21–32)
CREAT SERPL-MCNC: 0.83 MG/DL (ref 0.6–1.3)
EOSINOPHIL # BLD AUTO: 0.03 THOUSAND/ΜL (ref 0–0.61)
EOSINOPHIL NFR BLD AUTO: 0 % (ref 0–6)
ERYTHROCYTE [DISTWIDTH] IN BLOOD BY AUTOMATED COUNT: 12.5 % (ref 11.6–15.1)
GFR SERPL CREATININE-BSD FRML MDRD: 86 ML/MIN/1.73SQ M
GLUCOSE SERPL-MCNC: 97 MG/DL (ref 65–140)
HCT VFR BLD AUTO: 42.3 % (ref 36.5–49.3)
HGB BLD-MCNC: 13.7 G/DL (ref 12–17)
IMM GRANULOCYTES # BLD AUTO: 0.03 THOUSAND/UL (ref 0–0.2)
IMM GRANULOCYTES NFR BLD AUTO: 0 % (ref 0–2)
LYMPHOCYTES # BLD AUTO: 1.16 THOUSANDS/ΜL (ref 0.6–4.47)
LYMPHOCYTES NFR BLD AUTO: 12 % (ref 14–44)
MCH RBC QN AUTO: 30.3 PG (ref 26.8–34.3)
MCHC RBC AUTO-ENTMCNC: 32.4 G/DL (ref 31.4–37.4)
MCV RBC AUTO: 94 FL (ref 82–98)
MONOCYTES # BLD AUTO: 0.98 THOUSAND/ΜL (ref 0.17–1.22)
MONOCYTES NFR BLD AUTO: 10 % (ref 4–12)
NEUTROPHILS # BLD AUTO: 7.57 THOUSANDS/ΜL (ref 1.85–7.62)
NEUTS SEG NFR BLD AUTO: 78 % (ref 43–75)
NRBC BLD AUTO-RTO: 0 /100 WBCS
PLATELET # BLD AUTO: 167 THOUSANDS/UL (ref 149–390)
PMV BLD AUTO: 10.4 FL (ref 8.9–12.7)
POTASSIUM SERPL-SCNC: 3.4 MMOL/L (ref 3.5–5.3)
RBC # BLD AUTO: 4.52 MILLION/UL (ref 3.88–5.62)
SODIUM SERPL-SCNC: 148 MMOL/L (ref 136–145)
TSH SERPL DL<=0.05 MIU/L-ACNC: 1.07 UIU/ML (ref 0.36–3.74)
VIT B12 SERPL-MCNC: 1378 PG/ML (ref 100–900)
WBC # BLD AUTO: 9.79 THOUSAND/UL (ref 4.31–10.16)

## 2019-12-24 PROCEDURE — 97110 THERAPEUTIC EXERCISES: CPT

## 2019-12-24 PROCEDURE — 99232 SBSQ HOSP IP/OBS MODERATE 35: CPT | Performed by: PHYSICIAN ASSISTANT

## 2019-12-24 PROCEDURE — 85025 COMPLETE CBC W/AUTO DIFF WBC: CPT | Performed by: PHYSICIAN ASSISTANT

## 2019-12-24 PROCEDURE — 87086 URINE CULTURE/COLONY COUNT: CPT | Performed by: PHYSICIAN ASSISTANT

## 2019-12-24 PROCEDURE — 97530 THERAPEUTIC ACTIVITIES: CPT

## 2019-12-24 PROCEDURE — 84425 ASSAY OF VITAMIN B-1: CPT | Performed by: PHYSICIAN ASSISTANT

## 2019-12-24 PROCEDURE — 80048 BASIC METABOLIC PNL TOTAL CA: CPT | Performed by: PHYSICIAN ASSISTANT

## 2019-12-24 PROCEDURE — 82607 VITAMIN B-12: CPT | Performed by: PHYSICIAN ASSISTANT

## 2019-12-24 PROCEDURE — 84443 ASSAY THYROID STIM HORMONE: CPT | Performed by: PHYSICIAN ASSISTANT

## 2019-12-24 PROCEDURE — 92526 ORAL FUNCTION THERAPY: CPT

## 2019-12-24 RX ORDER — POTASSIUM CHLORIDE AND SODIUM CHLORIDE 450; 150 MG/100ML; MG/100ML
75 INJECTION, SOLUTION INTRAVENOUS CONTINUOUS
Status: ACTIVE | OUTPATIENT
Start: 2019-12-24 | End: 2019-12-24

## 2019-12-24 RX ADMIN — ENOXAPARIN SODIUM 40 MG: 40 INJECTION SUBCUTANEOUS at 08:30

## 2019-12-24 RX ADMIN — ATORVASTATIN CALCIUM 40 MG: 40 TABLET, FILM COATED ORAL at 18:45

## 2019-12-24 RX ADMIN — POTASSIUM CHLORIDE AND SODIUM CHLORIDE 75 ML/HR: 450; 150 INJECTION, SOLUTION INTRAVENOUS at 08:18

## 2019-12-24 RX ADMIN — SODIUM CHLORIDE 100 ML/HR: 0.9 INJECTION, SOLUTION INTRAVENOUS at 03:43

## 2019-12-24 RX ADMIN — CEFTRIAXONE 1000 MG: 1 INJECTION, SOLUTION INTRAVENOUS at 18:46

## 2019-12-24 RX ADMIN — ASPIRIN 81 MG 81 MG: 81 TABLET ORAL at 08:30

## 2019-12-24 NOTE — PLAN OF CARE
Problem: OCCUPATIONAL THERAPY ADULT  Goal: Performs self-care activities at highest level of function for planned discharge setting  See evaluation for individualized goals  Description  Treatment Interventions: ADL retraining, Functional transfer training, UE strengthening/ROM, Endurance training, Equipment evaluation/education, Patient/family training, Cognitive reorientation, Activityengagement          See flowsheet documentation for full assessment, interventions and recommendations  Outcome: Progressing  Note:   Limitation: Decreased ADL status, Decreased UE strength, Decreased Safe judgement during ADL, Decreased cognition, Decreased endurance, Decreased self-care trans, Decreased high-level ADLs     Assessment: Pt with deficits affecting overall functional performance as per initial eval   Presents supine, requires max A x 2 to complete bed mobility, initially requires cues to maintain sitting balance and to decrease lean to L  Attempt to complte bed to chair utilizing RW without success, SPT completed with max A x 2   Spoke with OTR who is in agreement pt will benefit from continued active OT services in attempt to facilitate return to highest LOF       OT Discharge Recommendation: Short Term Rehab

## 2019-12-24 NOTE — OCCUPATIONAL THERAPY NOTE
OT Note     12/24/19 1111   Restrictions/Precautions   Other Precautions Multiple lines; Fall Risk; Chair Alarm   ADL   Grooming Assistance 2  Maximal Assistance   Grooming Deficit Brushing hair   Grooming Comments Makes poor attempt   Therapeutic Exercise - ROM   UE-ROM Yes   ROM- Right Upper Extremities   R Shoulder AAROM; Flexion; Extension   R Weight/Reps/Sets 2 sets/10 no resistance   ROM - Left Upper Extremities    L Shoulder AAROM; Flexion; Extension   L Weight/Reps/Sets 2 sets/10, no resistance   Therapeutic Excerise-Strength   UE Strength Yes   Right Upper Extremity- Strength   R Elbow Elbow flexion;Elbow extension   R Wrist Wrist flexion;Wrist extension   R Hand   (Forearm pro/supination)   R Weight/Reps/Sets 1# thera p bar, 1# free wt, 2 sets/10   RUE Strength Comment Requires hand over hand for fullest movement and to continue to attend   Left Upper Extremity-Strength   L Elbow Elbow flexion;Elbow extension   L Wrist Wrist flexion;Wrist extension   L Hand   (Forearm pro/supination)   L Weights/Reps/Sets 1# thera p bar, 1# free wt, 2 sets/10   LUE Strength Comment Requires hand over hand to insure fullest movement and to continue to attend   Cognition   Orientation Level Oriented to person   Activity Tolerance   Activity Tolerance   (Appears to tolerate tx session)   Assessment   Assessment Pt with deficits affecting overall functional per formance as per initial eval   Presents seated recliner at bedside  Attempts encourage grooming with poor succcess  Completes BUE ROM/strengthening as per above with frequent hand over hand to insure fullest movement and to continue to attend to task  Tearful at one point  Attempts to orient to place without success  Spoke with OTR who i in agreement pt will benefit from continued active OT services in attempt to facilitate return to highest LOF     Plan   Goal Expiration Date 01/06/20   OT Treatment Day 1   Recommendation   OT Discharge Recommendation Short Term Rehab Remains seated recliner  Chair alarm activated  Call bell in reach

## 2019-12-24 NOTE — ASSESSMENT & PLAN NOTE
Patient's daughter notes transient confusion  Seems to be improving  Suspect this is metabolic encephalopathy in the setting of acute UTI as well as advanced dementia  Will continue IV fluid hydration and IV antibiotics  Will check TSH, thiamine, B12 level

## 2019-12-24 NOTE — PHYSICAL THERAPY NOTE
12/24/19 0853   Pain Assessment   Pain Assessment 0-10   Pain Score No Pain   Restrictions/Precautions   Weight Bearing Precautions Per Order No   Other Precautions Chair Alarm; Bed Alarm;Cognitive;Telemetry; Fall Risk   General   Chart Reviewed Yes   Family/Caregiver Present No   Cognition   Overall Cognitive Status Impaired   Arousal/Participation Alert; Cooperative   Attention Attends with cues to redirect   Orientation Level Oriented to person   Memory Decreased long term memory;Decreased short term memory   Following Commands Follows one step commands with increased time or repetition   Comments pt agreed to PT session   Subjective   Subjective "It's Shaji Day " Pt denies pain  Bed Mobility   Supine to Sit 2  Maximal assistance   Additional items Assist x 2; Increased time required;Verbal cues;LE management   Transfers   Sit to Stand 2  Maximal assistance   Additional items Assist x 2; Increased time required;Verbal cues   Stand to Sit 2  Maximal assistance   Additional items Assist x 2; Increased time required;Verbal cues   Stand pivot 2  Maximal assistance   Additional items Assist x 2; Increased time required;Verbal cues   Additional Comments attempted transfer with RW unsuccessfully first, then without AD second attempt to recliner   Ambulation/Elevation   Gait pattern Not appropriate; Not tested   Balance   Static Sitting Fair -   Dynamic Sitting Fair -   Static Standing Poor   Dynamic Standing Poor -   Endurance Deficit   Endurance Deficit Yes   Endurance Deficit Description pt fatigues quickly with activity   Activity Tolerance   Activity Tolerance Patient limited by fatigue   Exercises   Heelslides Supine;20 reps;AAROM; Bilateral   Hip Flexion Supine;20 reps;AAROM; Bilateral   Hip Abduction Supine;20 reps;AAROM; Bilateral   Hip Adduction Supine;20 reps;AAROM; Bilateral   Ankle Pumps Supine;25 reps;AAROM; Bilateral   Assessment   Prognosis Guarded   Problem List Decreased strength;Decreased range of motion;Decreased endurance; Impaired balance;Decreased mobility; Decreased cognition; Impaired judgement;Decreased coordination;Decreased safety awareness   Assessment Pt seen for PT treatment session this date with interventions consisting of Therapeutic exercise consisting of: AAROM 20 reps B LE in supine position and therapeutic activity consisting of training: supine<>sit transfers, sit<>stand transfers and stand pivot transfer bed to recliner  Pt agreeable to PT treatment session upon arrival, pt found supine in bed w/ HOB elevated, in no apparent distress  In comparison to previous session, pt with improvements in activity tolerance  Post session: pt returned back to recliner, chair alarm engaged and all needs in reach Continue to recommend STR at time of d/c in order to maximize pt's functional independence and safety w/ mobility  Pt continues to be functioning below baseline level, and remains limited 2* factors listed above and including decreased strength, endurance & safe functional mobility  PT will continue to see pt while here in order to address the deficits listed above and provide interventions consistent w/ POC in effort to achieve STGs  Goals   Patient Goals to get better   PT Treatment Day 1   Plan   Treatment/Interventions Functional transfer training;LE strengthening/ROM; Therapeutic exercise; Endurance training;Patient/family training;Bed mobility;Gait training   Progress Slow progress, decreased activity tolerance   PT Frequency   (3-5 x week)   Recommendation   Recommendation Short-term skilled PT   PT - OK to Discharge Yes  (when med cleared to STR)   Yulia Fiore, PTA

## 2019-12-24 NOTE — PROGRESS NOTES
Progress Note - Romy Castanon 1945, 75 y o  male MRN: [id number]    Unit/Bed#: 426-01 Encounter: [id number]    Primary Care Provider: No primary care provider on file  Date and time admitted to hospital: 12/22/2019  4:16 PM        Acute metabolic encephalopathy  Assessment & Plan  Patient's daughter notes transient confusion  Seems to be improving  Suspect this is metabolic encephalopathy in the setting of acute UTI as well as advanced dementia  Will continue IV fluid hydration and IV antibiotics  Will check TSH, thiamine, B12 level  Generalized weakness  Assessment & Plan  Likely multifactorial but especially in the setting of acute UTI    short-term rehab on discharge  Needs qualifying 3 midnight stay and with holiday will plan on tentative discharge 12/26  * Dysarthria  Assessment & Plan  Patient has acute on chronic dysarthria  Improved today per patient's daughter  It's possible this is a TIA vs  Metabolic encephalopathy  CTA shows-1   No acute intracranial hemorrhage, midline shift, or mass effect  2   Chronic small vessel ischemic changes  3   If there is continued clinical concern for acute infarct, further evaluation with MRI may be obtained which is more sensitive  4   80% narrowing of the left internal carotid artery just distal to the bifurcation  5   Mild to moderate atherosclerotic narrowing of the cavernous and supraclinoid portions of the bilateral internal carotid arteries  6   Mild focal narrowing of the intracranial right vertebral artery and moderate short segment narrowing of the intracranial left vertebral artery  7   No high-grade proximal stenosis of the visualized [place]  8   No significant intracranial arteriovenous malformation or aneurysm  MRI brain:   Chronic lacunar infarct in the left centrum semiovale  No mass, mass effect, midline shift  No hemorrhage identified  No evidence of recent infarct  Chronic lacunar infarcts bilateral cerebellum  Small scattered hyperintensities   on T2/FLAIR imaging are noted in the periventricular and subcortical white matter demonstrating an appearance that is statistically most likely to represent mild microangiopathic change  Continue stroke pathway  Telemetry monitoring - no a fib noted on telemetry thus far  Speech eval appreciated - purreed diet with nectar thick liquids recommended  Continue Daily aspirin, Lipitor given stenosis of L ICA  Echocardiogram - unremarkable  Check A1C, lipid panel - both well controlled  Monitor blood pressure closely - so far is controlled well  OT,PT speech eval - short term rehab recommended  Spoke with Dr Zeinab Allen of neurology  Encouraged medical treatment with aspirin and statin and outpatient follow up with vascular surgery re: carotid stenosis  Acute cystitis  Assessment & Plan  Patient reports urinary retention and suprapubic pain  He is also noted to have generalized weakness and confusion and a recent fall  UA shows-4-10 WBCs, innumerable bacteria, moderate blood  Leukocytosis also present  CT abdomen and pelvis shows- 4   Marked bladder distention  Started on IV ceftriaxone in the ER, will continue for now  Urine cultures not reflexed - ordered today from admission urine sample, will await results  Blood cultures pending  Camargo catheter initially placed, discontinued and appears to be passing voiding trial  Will continue to Follow urinary retention protocol  KURT (acute kidney injury) ([facility] [address] )  Assessment & Plan  Creatinine at 1 65 on admission, now back to normal at 0 83  No documented hx of CKD  Avoid nephrotoxic agents  Follow BMP daily  Hypernatremia  Assessment & Plan  Improving  Likely in the setting of volume depletion / NPO status,  Will continue IVF hydration with 1/2 normal saline at 75mL/hr x 10 hours  Gastroesophageal reflux disease without esophagitis  Assessment & Plan  He is currently on Prilosec for 20 mg p o  Daily  Will give formulary alternative Protonix 40 mg p o  Daily    Essential hypertension  Assessment & Plan  Blood pressure is stable  Continue PTA medciations        VTE Pharmacologic Prophylaxis:   Pharmacologic: Enoxaparin (Lovenox)  Mechanical VTE Prophylaxis in Place: Yes      Time Spent for Care: 30 minutes  More than 50% of total time spent on counseling and coordination of care as described above  Current Length of Stay: 2 day(s)    Current Patient Status: Inpatient   Certification Statement: The patient will continue to require additional inpatient hospital stay due to need for IV fluid hydration, discharge planning    Discharge Plan / Estimated Discharge Date: TBD, but likely 19 to STR  Code Status: Level 1 - Full Code        Subjective:   Patient is difficult to understand today, confused  Denies any pain  Reports good appetite, less weakness  Objective:   Vitals:   Temp (24hrs), Av 3 °F (36 8 °C), Min:98 3 °F (36 8 °C), Max:98 3 °F (36 8 °C)    Temp:  [98 3 °F (36 8 °C)] 98 3 °F (36 8 °C)  HR:  [59-83] 59  Resp:  [18] 18  BP: (120-143)/(68-73) 128/73  SpO2:  [97 %-99 %] 99 %  Body mass index is 22 05 kg/m²  Input and Output Summary (last 24 hours): Intake/Output Summary (Last 24 hours) at 2019 1326  Last data filed at 2019 0900  Gross per 24 hour   Intake 2500 ml   Output    Net 2500 ml       Physical Exam:   Physical Exam   Constitutional: He appears well-developed and well-nourished  HENT:   Head: Normocephalic  Mouth/Throat: Oropharynx is clear and moist    Neck: Neck supple  Cardiovascular: Normal rate, regular rhythm and normal heart sounds  No murmur heard  Pulmonary/Chest: Effort normal    Abdominal: Soft  He exhibits no distension  There is no tenderness  Musculoskeletal: He exhibits no edema  Neurological: He is alert  Confused about time and location  Skin: Skin is warm and dry  Psychiatric: He has a normal mood and affect  Nursing note and vitals reviewed  Additional Data:   Labs:  Results from last 7 days   Lab Units 12/24/19  0622   WBC Thousand/uL 9 79   HEMOGLOBIN g/dL 13 7   HEMATOCRIT % 42 3   PLATELETS Thousands/uL 167   NEUTROS PCT % 78*   LYMPHS PCT % 12*   MONOS PCT % 10   EOS PCT % 0     Results from last 7 days   Lab Units 12/24/19  0622 12/23/19  0517 12/22/19  1629   POTASSIUM mmol/L 3 4* 3 4* 3 7   CHLORIDE mmol/L 114* 112* 105   CO2 mmol/L 23 25 26   CO2, I-STAT mmol/L  --   --  26   BUN mg/dL 25 33* 49*   CREATININE mg/dL 0 83 1 01 1 68*   CALCIUM mg/dL 8 8 8 8 9 6   ALK PHOS U/L  --  76 91   ALT U/L  --  45 51   AST U/L  --  34 33   GLUCOSE, ISTAT mg/dl  --   --  134     Results from last 7 days   Lab Units 12/22/19  1629   INR  1 21*       * I Have Reviewed All Lab Data Listed Above  * Additional Pertinent Lab Tests Reviewed: All Labs Within Last 24 Hours Reviewed    Imaging:  Imaging Reports Reviewed Today Include: no new imaging to review  Recent Cultures (last 7 days):     Results from last 7 days   Lab Units 12/22/19  1905 12/22/19  1840   BLOOD CULTURE  No Growth at 24 hrs  No Growth at 24 hrs         Last 24 Hours Medication List:     Current Facility-Administered Medications:  aspirin 81 mg Oral Daily Jamal Anthony PA-C    atorvastatin 40 mg Oral QPM Jamal Anthony PA-C    cefTRIAXone 1,000 mg Intravenous Q24H Isaías Scanlon PA-C Last Rate: 1,000 mg (12/23/19 1729)   enoxaparin 40 mg Subcutaneous Q24H [address] Manoj Lomeli PA-C    iohexol 100 mL Intravenous Once in imaging Jamal Anthony PA-C    iohexol 70 mL Intravenous Once in imaging Jamal Anthony PA-C    ondansetron 4 mg Intravenous Q6H PRN Jamal Anthony PA-C    sodium chloride 0 45 % with KCl 20 mEq/L 75 mL/hr Intravenous Continuous Manoj Lomeli PA-C Last Rate: 75 mL/hr (12/24/19 0818)        Today, Patient Was Seen By: Isaías Scanlon PA-C    ** Please Note: Dragon 360 Dictation voice to text software may have been used in the creation of this document   **

## 2019-12-24 NOTE — ASSESSMENT & PLAN NOTE
Patient has acute on chronic dysarthria  Improved today per patient's daughter  It's possible this is a TIA vs  Metabolic encephalopathy  CTA shows-1   No acute intracranial hemorrhage, midline shift, or mass effect  2   Chronic small vessel ischemic changes  3   If there is continued clinical concern for acute infarct, further evaluation with MRI may be obtained which is more sensitive  4   80% narrowing of the left internal carotid artery just distal to the bifurcation  5   Mild to moderate atherosclerotic narrowing of the cavernous and supraclinoid portions of the bilateral internal carotid arteries  6   Mild focal narrowing of the intracranial right vertebral artery and moderate short segment narrowing of the intracranial left vertebral artery  7   No high-grade proximal stenosis of the visualized [place]  8   No significant intracranial arteriovenous malformation or aneurysm  MRI brain:   Chronic lacunar infarct in the left centrum semiovale  No mass, mass effect, midline shift  No hemorrhage identified  No evidence of recent infarct  Chronic lacunar infarcts bilateral cerebellum  Small scattered hyperintensities   on T2/FLAIR imaging are noted in the periventricular and subcortical white matter demonstrating an appearance that is statistically most likely to represent mild microangiopathic change  Continue stroke pathway  Telemetry monitoring - no a fib noted on telemetry thus far  Speech eval appreciated - purreed diet with nectar thick liquids recommended  Continue Daily aspirin, Lipitor given stenosis of L ICA  Echocardiogram - unremarkable  Check A1C, lipid panel - both well controlled  Monitor blood pressure closely - so far is controlled well  OT,PT speech eval - short term rehab recommended  Spoke with Dr Rafael Walker of neurology  Encouraged medical treatment with aspirin and statin and outpatient follow up with vascular surgery re: carotid stenosis

## 2019-12-24 NOTE — ASSESSMENT & PLAN NOTE
Improving  Likely in the setting of volume depletion / NPO status,  Will continue IVF hydration with 1/2 normal saline at 75mL/hr x 10 hours

## 2019-12-24 NOTE — ASSESSMENT & PLAN NOTE
Creatinine at 1 65 on admission, now back to normal at 0 83  No documented hx of CKD  Avoid nephrotoxic agents  Follow BMP daily

## 2019-12-24 NOTE — PLAN OF CARE
Problem: PHYSICAL THERAPY ADULT  Goal: Performs mobility at highest level of function for planned discharge setting  See evaluation for individualized goals  Description  Treatment/Interventions: Functional transfer training, LE strengthening/ROM, Elevations, Therapeutic exercise, Endurance training, Bed mobility, Gait training          See flowsheet documentation for full assessment, interventions and recommendations  Outcome: Progressing  Note:   Prognosis: Guarded  Problem List: Decreased strength, Decreased range of motion, Decreased endurance, Impaired balance, Decreased mobility, Decreased cognition, Impaired judgement, Decreased coordination, Decreased safety awareness  Assessment: Pt seen for PT treatment session this date with interventions consisting of Therapeutic exercise consisting of: AAROM 20 reps B LE in supine position and therapeutic activity consisting of training: supine<>sit transfers, sit<>stand transfers and stand pivot transfer bed to recliner  Pt agreeable to PT treatment session upon arrival, pt found supine in bed w/ HOB elevated, in no apparent distress  In comparison to previous session, pt with improvements in activity tolerance  Post session: pt returned back to recliner, chair alarm engaged and all needs in reach Continue to recommend STR at time of d/c in order to maximize pt's functional independence and safety w/ mobility  Pt continues to be functioning below baseline level, and remains limited 2* factors listed above and including decreased strength, endurance & safe functional mobility  PT will continue to see pt while here in order to address the deficits listed above and provide interventions consistent w/ POC in effort to achieve STGs  Recommendation: Short-term skilled PT     PT - OK to Discharge: Yes(when med cleared to STR)    See flowsheet documentation for full assessment

## 2019-12-24 NOTE — SPEECH THERAPY NOTE
Speech/Language Pathology Progress Note    Patient Name: Martina Green  Today's Date: 12/24/2019    Subjective:  Pt was awake and alert, sitting up in bed for breakfast  Speech remains dysarthric (question less intelligible today) and confused/nonsensical  Pt able to provide his birthday but not oriented to location/situation, date, president, etc     Objective:  Pt seen for diagnostic swallow therapy  Current diet is puree and nectar thick liquids  SLP offered bites of puree, and pt demonstrated decreased bolus acceptance opening his mouth only slightly for bites  He then had significantly delayed oral initiation/decreased A-P transfer, often requiring a sip of NTL to initiate swallow  Pt was unable to draw through a straw today  Sips of NTL given via cup  Swallow initiation ranged from minimal delay to significant delay  Mild cough noted x2 with puree trials  Best swallow promptness was after oral care with single sips of NTL via cup (not following puree bolus)  Thorough oral care provided and upper denture put in place  Assessment:  Only a small amount of puree was taken today  NTL remains appropriate due to weakness and intermittent swallow delay  Pt was able to volitionally move his tongue better today (fully lateralize to L and R) however speech remains dysarthric and intelligibility is reduced  Plan/Recommendations:  Continue puree diet and NTL  Continue meds crushed in puree and full feed  ST will continue to follow

## 2019-12-24 NOTE — ASSESSMENT & PLAN NOTE
Likely multifactorial but especially in the setting of acute UTI    short-term rehab on discharge  Needs qualifying 3 midnight stay and with holiday will plan on tentative discharge 12/26

## 2019-12-24 NOTE — SOCIAL WORK
[facility] accepted patient   5th floor is unsure if they have a bed for him  However They will accept him if they have a bed  on 12/26/19

## 2019-12-24 NOTE — ASSESSMENT & PLAN NOTE
Patient reports urinary retention and suprapubic pain  He is also noted to have generalized weakness and confusion and a recent fall  UA shows-4-10 WBCs, innumerable bacteria, moderate blood  Leukocytosis also present  CT abdomen and pelvis shows- 4   Marked bladder distention  Started on IV ceftriaxone in the ER, will continue for now  Urine cultures not reflexed - ordered today from admission urine sample, will await results  Blood cultures pending  Camargo catheter initially placed, discontinued and appears to be passing voiding trial  Will continue to Follow urinary retention protocol

## 2019-12-24 NOTE — OCCUPATIONAL THERAPY NOTE
OT Note     12/24/19 0839   Restrictions/Precautions   Other Precautions Fall Risk;Multiple lines; Chair Alarm; Bed Alarm   Bed Mobility   Supine to Sit 2  Maximal assistance   Additional items Assist x 2;HOB elevated; Increased time required;LE management   Transfers   Sit to Stand 2  Maximal assistance   Additional items Assist x 2;Verbal cues   Stand to Sit 2  Maximal assistance   Additional items Assist x 2;Verbal cues   Stand pivot 2  Maximal assistance   Additional items Assist x 2   Additional Comments Initial attempt utilize RW to facilitate OOB to recliner without success   Activity Tolerance   Activity Tolerance   (Tolerates tx session)   Assessment   Assessment Pt with deficits affecting overall functional performance as per initial eval   Presents supine, requires max A x 2 to complete bed mobility, initially requires cues to maintain sitting balance and to decrease lean to L  Attempt to complte bed to chair utilizing RW without success, SPT completed with max A x 2   Spoke with OTR who is in agreement pt will benefit from continued active OT services in attempt to facilitate return to highest LOF  Plan   Goal Expiration Date 01/06/20   OT Treatment Day 1   Recommendation   OT Discharge Recommendation Short Term Rehab   Remains seated recliner at bedside  All needs in reach  Chair alarm activated

## 2019-12-24 NOTE — SOCIAL WORK
PT and OT are recommending short term rehab  For the patient today  Spoke with patients wife and she would like the patient to go to rehab  A post acute care recommendation was made by your care team for STR  Discussed Freedom of Choice with patient  List of facilities given to POA  via in person  POA aware the list is custom filtered for them by preference  and that [facility] post acute providers are designated  Pt 's wife would like us to make a referral to 1) 5th floor and 2) [facility]  Referrals sent to both places

## 2019-12-25 LAB
ANION GAP SERPL CALCULATED.3IONS-SCNC: 11 MMOL/L (ref 4–13)
BACTERIA UR CULT: NORMAL
BASOPHILS # BLD AUTO: 0.02 THOUSANDS/ΜL (ref 0–0.1)
BASOPHILS NFR BLD AUTO: 0 % (ref 0–1)
BUN SERPL-MCNC: 15 MG/DL (ref 5–25)
CALCIUM SERPL-MCNC: 8.6 MG/DL (ref 8.3–10.1)
CHLORIDE SERPL-SCNC: 110 MMOL/L (ref 100–108)
CO2 SERPL-SCNC: 24 MMOL/L (ref 21–32)
CREAT SERPL-MCNC: 0.8 MG/DL (ref 0.6–1.3)
EOSINOPHIL # BLD AUTO: 0.1 THOUSAND/ΜL (ref 0–0.61)
EOSINOPHIL NFR BLD AUTO: 1 % (ref 0–6)
ERYTHROCYTE [DISTWIDTH] IN BLOOD BY AUTOMATED COUNT: 12.3 % (ref 11.6–15.1)
GFR SERPL CREATININE-BSD FRML MDRD: 87 ML/MIN/1.73SQ M
GLUCOSE SERPL-MCNC: 98 MG/DL (ref 65–140)
HCT VFR BLD AUTO: 40.5 % (ref 36.5–49.3)
HGB BLD-MCNC: 13.7 G/DL (ref 12–17)
IMM GRANULOCYTES # BLD AUTO: 0.03 THOUSAND/UL (ref 0–0.2)
IMM GRANULOCYTES NFR BLD AUTO: 0 % (ref 0–2)
LYMPHOCYTES # BLD AUTO: 1.29 THOUSANDS/ΜL (ref 0.6–4.47)
LYMPHOCYTES NFR BLD AUTO: 14 % (ref 14–44)
MCH RBC QN AUTO: 31.3 PG (ref 26.8–34.3)
MCHC RBC AUTO-ENTMCNC: 33.8 G/DL (ref 31.4–37.4)
MCV RBC AUTO: 93 FL (ref 82–98)
MONOCYTES # BLD AUTO: 0.89 THOUSAND/ΜL (ref 0.17–1.22)
MONOCYTES NFR BLD AUTO: 10 % (ref 4–12)
NEUTROPHILS # BLD AUTO: 6.64 THOUSANDS/ΜL (ref 1.85–7.62)
NEUTS SEG NFR BLD AUTO: 75 % (ref 43–75)
NRBC BLD AUTO-RTO: 0 /100 WBCS
PLATELET # BLD AUTO: 183 THOUSANDS/UL (ref 149–390)
PMV BLD AUTO: 10.6 FL (ref 8.9–12.7)
POTASSIUM SERPL-SCNC: 3.3 MMOL/L (ref 3.5–5.3)
RBC # BLD AUTO: 4.38 MILLION/UL (ref 3.88–5.62)
SODIUM SERPL-SCNC: 145 MMOL/L (ref 136–145)
WBC # BLD AUTO: 8.97 THOUSAND/UL (ref 4.31–10.16)

## 2019-12-25 PROCEDURE — 80048 BASIC METABOLIC PNL TOTAL CA: CPT | Performed by: PHYSICIAN ASSISTANT

## 2019-12-25 PROCEDURE — 85025 COMPLETE CBC W/AUTO DIFF WBC: CPT | Performed by: PHYSICIAN ASSISTANT

## 2019-12-25 PROCEDURE — 99232 SBSQ HOSP IP/OBS MODERATE 35: CPT | Performed by: NURSE PRACTITIONER

## 2019-12-25 RX ADMIN — ASPIRIN 81 MG 81 MG: 81 TABLET ORAL at 10:13

## 2019-12-25 RX ADMIN — ENOXAPARIN SODIUM 40 MG: 40 INJECTION SUBCUTANEOUS at 10:13

## 2019-12-25 RX ADMIN — CEFTRIAXONE 1000 MG: 1 INJECTION, SOLUTION INTRAVENOUS at 18:00

## 2019-12-25 RX ADMIN — ATORVASTATIN CALCIUM 40 MG: 40 TABLET, FILM COATED ORAL at 18:00

## 2019-12-25 NOTE — ASSESSMENT & PLAN NOTE
Patient found to have urinary retention, confusion, and came with reports of a fall    UA shows-4-10 WBCs, innumerable bacteria, moderate blood  Leukocytosis also present  CT abdomen and pelvis shows- 4   Marked bladder distention  Started on IV ceftriaxone in the ER, will continue for now can tailor based on urine culture results     Urine cultures pending continue to trend  Blood cultures no growth at 48 hours  Camargo catheter initially placed, discontinued and appears to be passing voiding trial    Continue urinary retention protocol

## 2019-12-25 NOTE — ASSESSMENT & PLAN NOTE
Patient has acute on chronic dysarthria  Possible TIA vs  Metabolic encephalopathy  CTA shows-1   No acute intracranial hemorrhage, midline shift, or mass effect  2   Chronic small vessel ischemic changes  3   If there is continued clinical concern for acute infarct, further evaluation with MRI may be obtained which is more sensitive  4   80% narrowing of the left internal carotid artery just distal to the bifurcation  5   Mild to moderate atherosclerotic narrowing of the cavernous and supraclinoid portions of the bilateral internal carotid arteries  6   Mild focal narrowing of the intracranial right vertebral artery and moderate short segment narrowing of the intracranial left vertebral artery  7   No high-grade proximal stenosis of the visualized [place] [facility] [place]  8   No significant intracranial arteriovenous malformation or aneurysm  MRI brain:   Chronic lacunar infarct in the left centrum semiovale  No mass, mass effect, midline shift  No hemorrhage identified  No evidence of recent infarct  Chronic lacunar infarcts bilateral cerebellum  Small scattered hyperintensities   on T2/FLAIR imaging are noted in the periventricular and subcortical white matter demonstrating an appearance that is statistically most likely to represent mild microangiopathic change  Continue stroke pathway  Telemetry monitoring - no a fib noted on telemetry thus far  Speech eval appreciated - pureed diet with nectar thick liquids recommended  Continue Daily aspirin, Lipitor given stenosis of L ICA  Echocardiogram - unremarkable  Check A1C, lipid panel - both well controlled  Monitor blood pressure closely - so far is controlled well  OT,PT speech eval - short term rehab recommended  Neurology evaluation completed at this point encouraging medical management with aspirin and statin along with outpatient follow up with vascular surgery re: carotid stenosis

## 2019-12-25 NOTE — ASSESSMENT & PLAN NOTE
Patient's daughter notes transient confusion  Seems to be improving  Suspect this is metabolic encephalopathy in the setting of acute UTI as well as advanced dementia  Will continue IV fluid hydration and IV antibiotics     TSH normal  B12 noted

## 2019-12-25 NOTE — PROGRESS NOTES
Progress Note - Polly Fuentes 1945, 75 y o  male MRN: [id number]    Unit/Bed#: 426-01 Encounter: [id number]    Primary Care Provider: No primary care provider on file  Date and time admitted to hospital: 12/22/2019  4:16 PM        * Dysarthria  Assessment & Plan  Patient has acute on chronic dysarthria  Possible TIA vs  Metabolic encephalopathy  CTA shows-1   No acute intracranial hemorrhage, midline shift, or mass effect  2   Chronic small vessel ischemic changes  3   If there is continued clinical concern for acute infarct, further evaluation with MRI may be obtained which is more sensitive  4   80% narrowing of the left internal carotid artery just distal to the bifurcation  5   Mild to moderate atherosclerotic narrowing of the cavernous and supraclinoid portions of the bilateral internal carotid arteries  6   Mild focal narrowing of the intracranial right vertebral artery and moderate short segment narrowing of the intracranial left vertebral artery  7   No high-grade proximal stenosis of the visualized [place] [facility] [place]  8   No significant intracranial arteriovenous malformation or aneurysm  MRI brain:   Chronic lacunar infarct in the left centrum semiovale  No mass, mass effect, midline shift  No hemorrhage identified  No evidence of recent infarct  Chronic lacunar infarcts bilateral cerebellum  Small scattered hyperintensities   on T2/FLAIR imaging are noted in the periventricular and subcortical white matter demonstrating an appearance that is statistically most likely to represent mild microangiopathic change  Continue stroke pathway  Telemetry monitoring - no a fib noted on telemetry thus far  Speech eval appreciated - pureed diet with nectar thick liquids recommended  Continue Daily aspirin, Lipitor given stenosis of L ICA  Echocardiogram - unremarkable  Check A1C, lipid panel - both well controlled  Monitor blood pressure closely - so far is controlled well     OT,PT speech eval - short term rehab recommended  Neurology evaluation completed at this point encouraging medical management with aspirin and statin along with outpatient follow up with vascular surgery re: carotid stenosis  Acute cystitis  Assessment & Plan  Patient found to have urinary retention, confusion, and came with reports of a fall    UA shows-4-10 WBCs, innumerable bacteria, moderate blood  Leukocytosis also present  CT abdomen and pelvis shows- 4   Marked bladder distention  Started on IV ceftriaxone in the ER, will continue for now can tailor based on urine culture results  Urine cultures pending continue to trend  Blood cultures no growth at 48 hours  Camargo catheter initially placed, discontinued and appears to be passing voiding trial    Continue urinary retention protocol      Acute metabolic encephalopathy  Assessment & Plan  Patient's daughter notes transient confusion  Seems to be improving  Suspect this is metabolic encephalopathy in the setting of acute UTI as well as advanced dementia  Will continue IV fluid hydration and IV antibiotics  TSH normal  B12 noted      Hypernatremia  Assessment & Plan  Improving  Likely in the setting of volume depletion   Now resolved    Generalized weakness  Assessment & Plan  Likely multifactorial but especially in the setting of acute UTI    short-term rehab on discharge  Gastroesophageal reflux disease without esophagitis  Assessment & Plan  He is currently on Prilosec for 20 mg p o  Daily  Will give formulary alternative Protonix 40 mg p o  Daily    Essential hypertension  Assessment & Plan  Blood pressure is stable  Continue PTA medciations    KURT (acute kidney injury) ([facility] [address] )  Assessment & Plan  Creatinine at 1 65 on admission, now back to normal at 0 83  No documented hx of CKD  Avoid nephrotoxic agents  Follow BMP daily          VTE Pharmacologic Prophylaxis:   Pharmacologic: Enoxaparin (Lovenox)  Mechanical VTE Prophylaxis in Place: Yes    Patient Centered Rounds: I have performed bedside rounds with nursing staff today  Discussions with Specialists or Other Care Team Provider:  Neurology note reviewed    Education and Discussions with Family / Patient:  Patient briefly    Time Spent for Care: 30 minutes  More than 50% of total time spent on counseling and coordination of care as described above  Current Length of Stay: 3 day(s)    Current Patient Status: Inpatient   Certification Statement: The patient will continue to require additional inpatient hospital stay due to Ongoing treatment of metabolic encephalopathy suspected in setting of acute cystitis    Discharge Plan:  Not medically cleared    Code Status: Level 1 - Full Code      Subjective:   Patient can state his name briefly is name  Exam limited by patient's limited pursue patient in the exam setting  Objective:     Vitals:   Temp (24hrs), Av 5 °F (36 9 °C), Min:98 1 °F (36 7 °C), Max:99 2 °F (37 3 °C)    Temp:  [98 1 °F (36 7 °C)-99 2 °F (37 3 °C)] 98 1 °F (36 7 °C)  HR:  [71-86] 86  Resp:  [18-19] 18  BP: (101-135)/(63-76) 105/68  SpO2:  [98 %-99 %] 98 %  Body mass index is 22 05 kg/m²  Input and Output Summary (last 24 hours): Intake/Output Summary (Last 24 hours) at 2019 1308  Last data filed at 2019 0036  Gross per 24 hour   Intake 100 ml   Output 1270 ml   Net -1170 ml       Physical Exam:     Physical Exam   HENT:   Head: Normocephalic and atraumatic  Eyes: Conjunctivae and EOM are normal    Cardiovascular: Normal rate, regular rhythm and normal heart sounds  Pulmonary/Chest: Effort normal and breath sounds normal    Abdominal: Soft  Bowel sounds are normal    Musculoskeletal: Normal range of motion  Neurological: He is alert  Alert to self   Skin: Skin is warm and dry           Additional Data:     Labs:    Results from last 7 days   Lab Units 19  0523   WBC Thousand/uL 8 97   HEMOGLOBIN g/dL 13 7   HEMATOCRIT % 40 5   PLATELETS Thousands/uL 183   NEUTROS PCT % 75   LYMPHS PCT % 14   MONOS PCT % 10   EOS PCT % 1     Results from last 7 days   Lab Units 12/25/19  0523  12/23/19  0517   SODIUM mmol/L 145   < > 149*   POTASSIUM mmol/L 3 3*   < > 3 4*   CHLORIDE mmol/L 110*   < > 112*   CO2 mmol/L 24   < > 25   BUN mg/dL 15   < > 33*   CREATININE mg/dL 0 80   < > 1 01   ANION GAP mmol/L 11   < > 12   CALCIUM mg/dL 8 6   < > 8 8   ALBUMIN g/dL  --   --  3 2*   TOTAL BILIRUBIN mg/dL  --   --  1 10*   ALK PHOS U/L  --   --  76   ALT U/L  --   --  45   AST U/L  --   --  34   GLUCOSE RANDOM mg/dL 98   < > 95    < > = values in this interval not displayed  Results from last 7 days   Lab Units 12/22/19  1629   INR  1 21*         Results from last 7 days   Lab Units 12/23/19  0517   HEMOGLOBIN A1C % 5 0     Results from last 7 days   Lab Units 12/22/19  1629   LACTIC ACID mmol/L 1 7           * I Have Reviewed All Lab Data Listed Above  * Additional Pertinent Lab Tests Reviewed: [address] Admission Reviewed    Imaging:    Imaging Reports Reviewed Today Include:  As mentioned above      Recent Cultures (last 7 days):     Results from last 7 days   Lab Units 12/22/19  1905 12/22/19  1840   BLOOD CULTURE  No Growth at 48 hrs  No Growth at 48 hrs  Last 24 Hours Medication List:     Current Facility-Administered Medications:  aspirin 81 mg Oral Daily Jamal Anthony PA-C    atorvastatin 40 mg Oral QPM Jamal Anthony PA-C    cefTRIAXone 1,000 mg Intravenous Q24H Teresa Benjamin PA-C Last Rate: 1,000 mg (12/24/19 1846)   enoxaparin 40 mg Subcutaneous Q24H [address] Manoj Lomeli PA-C    iohexol 100 mL Intravenous Once in imaging Jamal Anthony PA-C    iohexol 70 mL Intravenous Once in imaging Jamal Anthony PA-C    ondansetron 4 mg Intravenous Q6H PRN Jamal Anthony PA-C         Today, Patient Was Seen By: HEIDI Mccloud    ** Please Note: Dictation voice to text software may have been used in the creation of this document  **

## 2019-12-26 ENCOUNTER — TELEPHONE (OUTPATIENT)
Dept: OTHER | Facility: HOSPITAL | Age: 74
End: 2019-12-26

## 2019-12-26 PROBLEM — R33.9 URINARY RETENTION: Status: ACTIVE | Noted: 2019-12-26

## 2019-12-26 PROBLEM — R82.90 ABNORMAL URINALYSIS: Status: ACTIVE | Noted: 2019-12-22

## 2019-12-26 LAB
ANION GAP SERPL CALCULATED.3IONS-SCNC: 9 MMOL/L (ref 4–13)
BUN SERPL-MCNC: 18 MG/DL (ref 5–25)
CALCIUM SERPL-MCNC: 8.7 MG/DL (ref 8.3–10.1)
CHLORIDE SERPL-SCNC: 111 MMOL/L (ref 100–108)
CO2 SERPL-SCNC: 24 MMOL/L (ref 21–32)
CREAT SERPL-MCNC: 0.86 MG/DL (ref 0.6–1.3)
ERYTHROCYTE [DISTWIDTH] IN BLOOD BY AUTOMATED COUNT: 12.2 % (ref 11.6–15.1)
GFR SERPL CREATININE-BSD FRML MDRD: 85 ML/MIN/1.73SQ M
GLUCOSE SERPL-MCNC: 102 MG/DL (ref 65–140)
GLUCOSE SERPL-MCNC: 114 MG/DL (ref 65–140)
HCT VFR BLD AUTO: 40.9 % (ref 36.5–49.3)
HGB BLD-MCNC: 13.8 G/DL (ref 12–17)
MCH RBC QN AUTO: 30.7 PG (ref 26.8–34.3)
MCHC RBC AUTO-ENTMCNC: 33.7 G/DL (ref 31.4–37.4)
MCV RBC AUTO: 91 FL (ref 82–98)
PLATELET # BLD AUTO: 206 THOUSANDS/UL (ref 149–390)
PMV BLD AUTO: 10.1 FL (ref 8.9–12.7)
POTASSIUM SERPL-SCNC: 3.4 MMOL/L (ref 3.5–5.3)
RBC # BLD AUTO: 4.49 MILLION/UL (ref 3.88–5.62)
SODIUM SERPL-SCNC: 144 MMOL/L (ref 136–145)
WBC # BLD AUTO: 8.4 THOUSAND/UL (ref 4.31–10.16)

## 2019-12-26 PROCEDURE — 82948 REAGENT STRIP/BLOOD GLUCOSE: CPT

## 2019-12-26 PROCEDURE — 99232 SBSQ HOSP IP/OBS MODERATE 35: CPT | Performed by: PHYSICIAN ASSISTANT

## 2019-12-26 PROCEDURE — 85027 COMPLETE CBC AUTOMATED: CPT | Performed by: NURSE PRACTITIONER

## 2019-12-26 PROCEDURE — 80048 BASIC METABOLIC PNL TOTAL CA: CPT | Performed by: NURSE PRACTITIONER

## 2019-12-26 PROCEDURE — 99223 1ST HOSP IP/OBS HIGH 75: CPT

## 2019-12-26 PROCEDURE — 97530 THERAPEUTIC ACTIVITIES: CPT

## 2019-12-26 PROCEDURE — 97110 THERAPEUTIC EXERCISES: CPT

## 2019-12-26 PROCEDURE — 92526 ORAL FUNCTION THERAPY: CPT

## 2019-12-26 RX ORDER — TAMSULOSIN HYDROCHLORIDE 0.4 MG/1
0.4 CAPSULE ORAL
Status: DISCONTINUED | OUTPATIENT
Start: 2019-12-26 | End: 2019-12-27 | Stop reason: HOSPADM

## 2019-12-26 RX ADMIN — TAMSULOSIN HYDROCHLORIDE 0.4 MG: 0.4 CAPSULE ORAL at 10:31

## 2019-12-26 RX ADMIN — ASPIRIN 81 MG 81 MG: 81 TABLET ORAL at 08:51

## 2019-12-26 RX ADMIN — ENOXAPARIN SODIUM 40 MG: 40 INJECTION SUBCUTANEOUS at 08:51

## 2019-12-26 RX ADMIN — ATORVASTATIN CALCIUM 40 MG: 40 TABLET, FILM COATED ORAL at 17:56

## 2019-12-26 NOTE — PROGRESS NOTES
Notified by primary Nurse that patient was again bladder scanned for 566 cc  He was straight cath for fourth time  Vital signs within normal limits  He does not complain of any suprapubic pain  Will order Urology consult    Continue to monitor Urinary output

## 2019-12-26 NOTE — PROGRESS NOTES
Progress Note - Olamide Mayer 1945, 75 y o  male MRN: [id number]    Unit/Bed#: 426-01 Encounter: [id number]    Primary Care Provider: No primary care provider on file  Date and time admitted to hospital: 12/22/2019  4:16 PM        Acute metabolic encephalopathy  Assessment & Plan  Patient's daughter notes transient confusion  Per daughter Tj Farm- patient is not back to baseline  Suspect this is metabolic encephalopathy in the setting of advanced dementia  Urine culture: No Growth <1000 cfu/mL  TSH normal  B12 noted  Will consult neurology given no improvement  * Dysarthria  Assessment & Plan  Patient has acute on chronic dysarthria  Possible TIA vs  Metabolic encephalopathy  CTA shows-1   No acute intracranial hemorrhage, midline shift, or mass effect  2   Chronic small vessel ischemic changes  3   If there is continued clinical concern for acute infarct, further evaluation with MRI may be obtained which is more sensitive  4   80% narrowing of the left internal carotid artery just distal to the bifurcation  5   Mild to moderate atherosclerotic narrowing of the cavernous and supraclinoid portions of the bilateral internal carotid arteries  6   Mild focal narrowing of the intracranial right vertebral artery and moderate short segment narrowing of the intracranial left vertebral artery  7   No high-grade proximal stenosis of the visualized [place]  8   No significant intracranial arteriovenous malformation or aneurysm  MRI brain:   Chronic lacunar infarct in the left centrum semiovale  No mass, mass effect, midline shift  No hemorrhage identified  No evidence of recent infarct  Chronic lacunar infarcts bilateral cerebellum  Small scattered hyperintensities   on T2/FLAIR imaging are noted in the periventricular and subcortical white matter demonstrating an appearance that is statistically most likely to represent mild microangiopathic change      Continue stroke pathway  Telemetry monitoring - no a fib noted on telemetry thus far  Speech eval appreciated - pureed diet with nectar thick liquids recommended  Continue Daily aspirin, Lipitor given stenosis of L ICA  Echocardiogram - unremarkable  Check A1C, lipid panel - both well controlled  Monitor blood pressure closely - so far is controlled well  OT,PT speech eval - short term rehab recommended  Outpatient follow up with vascular surgery re: carotid stenosis  Consult neurology      KURT (acute kidney injury) [facility])  Assessment & Plan  Resolved  Creatinine at 1 65 on admission, now back to normal at 0 83  No documented hx of CKD  Avoid nephrotoxic agents  Follow BMP daily  Generalized weakness  Assessment & Plan  Likely multifactorial   Short-term rehab on discharge  Abnormal urinalysis  Assessment & Plan  Patient found to have urinary retention, confusion, and came with reports of a fall    UA shows-4-10 WBCs, innumerable bacteria, moderate blood  Leukocytosis also present  CT abdomen and pelvis shows- 4   Marked bladder distention  Started on IV ceftriaxone in the ER, this was continued on admission  Urine cultures: No Growth <1000 cfu/mL  Blood cultures no growth at 48 hours  Will discontinue IV antibiotics  Patient failed voiding trial and urology consulted  Camargo catheter placed today 12/26        Urinary retention  Assessment & Plan  Patient continues to have urinary retention  Urology consultation appreciated  Camargo catheter placed today 12/26  Flomax started by Urology today 1226    Essential hypertension  Assessment & Plan  Blood pressure is stable  Continue PTA medciations    Gastroesophageal reflux disease without esophagitis  Assessment & Plan  He is currently on Prilosec for 20 mg p o  Daily  Continue formulary alternative Protonix 40 mg p o  Daily    Hypernatremia  Assessment & Plan  Resolved    Likely in the setting of volume depletion         VTE Pharmacologic Prophylaxis:   Pharmacologic: Enoxaparin (Lovenox)  Mechanical VTE Prophylaxis in Place: Yes    Patient Centered Rounds: I have performed bedside rounds with nursing staff today  Discussions with Specialists or Other Care Team Provider:  nursing, CM, and attending      Education and Discussions with Family / Patient: Luz Elena Heard updated via telephone    Time Spent for Care: 20 minutes  More than 50% of total time spent on counseling and coordination of care as described above  Current Length of Stay: 4 day(s)    Current Patient Status: Inpatient   Certification Statement: The patient will continue to require additional inpatient hospital stay due to continued workup for acute encephalopathy with neuro consult    Discharge Plan: TBD    Code Status: Level 1 - Full Code      Subjective: The patient was seen and examined  The patient is pleasantly confused  He responds to questions but answers are mumbled and difficulty to understand  Objective:     Vitals:   Temp (24hrs), Av 3 °F (36 8 °C), Min:98 °F (36 7 °C), Max:98 7 °F (37 1 °C)    Temp:  [98 °F (36 7 °C)-98 7 °F (37 1 °C)] 98 2 °F (36 8 °C)  HR:  [70-94] 91  Resp:  [17-20] 18  BP: (108-131)/(67-73) 119/67  SpO2:  [96 %-98 %] 96 %  Body mass index is 22 17 kg/m²  Input and Output Summary (last 24 hours): Intake/Output Summary (Last 24 hours) at 2019 1433  Last data filed at 2019 1246  Gross per 24 hour   Intake 560 ml   Output 2325 ml   Net -1765 ml       Physical Exam:     Physical Exam   Constitutional: Vital signs are normal  He is active  Pulmonary/Chest: Effort normal and breath sounds normal  He has no wheezes  He has no rhonchi  He has no rales  Abdominal: Soft  Normal appearance and bowel sounds are normal  There is no tenderness  Neurological: He is alert  The patient is not oriented to place or time  His speech is difficulty to understand  Skin: Skin is warm, dry and intact  Nursing note and vitals reviewed        Additional Data: Labs:    Results from last 7 days   Lab Units 12/26/19  0432 12/25/19  0523   WBC Thousand/uL 8 40 8 97   HEMOGLOBIN g/dL 13 8 13 7   HEMATOCRIT % 40 9 40 5   PLATELETS Thousands/uL 206 183   NEUTROS PCT %  --  75   LYMPHS PCT %  --  14   MONOS PCT %  --  10   EOS PCT %  --  1     Results from last 7 days   Lab Units 12/26/19  0432  12/23/19  0517   SODIUM mmol/L 144   < > 149*   POTASSIUM mmol/L 3 4*   < > 3 4*   CHLORIDE mmol/L 111*   < > 112*   CO2 mmol/L 24   < > 25   BUN mg/dL 18   < > 33*   CREATININE mg/dL 0 86   < > 1 01   ANION GAP mmol/L 9   < > 12   CALCIUM mg/dL 8 7   < > 8 8   ALBUMIN g/dL  --   --  3 2*   TOTAL BILIRUBIN mg/dL  --   --  1 10*   ALK PHOS U/L  --   --  76   ALT U/L  --   --  45   AST U/L  --   --  34   GLUCOSE RANDOM mg/dL 102   < > 95    < > = values in this interval not displayed  Results from last 7 days   Lab Units 12/22/19  1629   INR  1 21*         Results from last 7 days   Lab Units 12/23/19  0517   HEMOGLOBIN A1C % 5 0     Results from last 7 days   Lab Units 12/22/19  1629   LACTIC ACID mmol/L 1 7           * I Have Reviewed All Lab Data Listed Above  * Additional Pertinent Lab Tests Reviewed: All Labs Within Last 24 Hours Reviewed    Imaging:    Imaging Reports Reviewed Today Include: none  Imaging Personally Reviewed by Myself Includes:  none    Recent Cultures (last 7 days):     Results from last 7 days   Lab Units 12/24/19  1609 12/22/19  1905 12/22/19  1840   BLOOD CULTURE   --  No Growth at 72 hrs  No Growth at 72 hrs     URINE CULTURE  No Growth <1000 cfu/mL  --   --        Last 24 Hours Medication List:     Current Facility-Administered Medications:  aspirin 81 mg Oral Daily Jamal Anthony PA-C   atorvastatin 40 mg Oral QPM Jamal Anthony PA-C   enoxaparin 40 mg Subcutaneous Q24H [facility] Manoj Lomeli PA-C   iohexol 100 mL Intravenous Once in imaging Jamal Anthony PA-C   iohexol 70 mL Intravenous Once in imaging Jamal Anthony PA-C   ondansetron 4 mg Intravenous Q6H PRN Jamal EVELIA Anthony   tamsulosin 0 4 mg Oral Daily With 100 [employer] EVELIA Segura        Today, Patient Was Seen By: Darryl Mendez PA-C    ** Please Note: Dictation voice to text software may have been used in the creation of this document   **

## 2019-12-26 NOTE — PLAN OF CARE
Problem: Prexisting or High Potential for Compromised Skin Integrity  Goal: Skin integrity is maintained or improved  Description  INTERVENTIONS:  - Identify patients at risk for skin breakdown  - Assess and monitor skin integrity  - Assess and monitor nutrition and hydration status  - Monitor labs   - Assess for incontinence   - Turn and reposition patient  - Assist with mobility/ambulation  - Relieve pressure over bony prominences  - Avoid friction and shearing  - Provide appropriate hygiene as needed including keeping skin clean and dry  - Evaluate need for skin moisturizer/barrier cream  - Collaborate with interdisciplinary team   - Patient/family teaching  - Consider wound care consult   Outcome: Progressing     Problem: Potential for Falls  Goal: Patient will remain free of falls  Description  INTERVENTIONS:  - Assess patient frequently for physical needs  -  Identify cognitive and physical deficits and behaviors that affect risk of falls    -  [place] fall precautions as indicated by assessment   - Educate patient/family on patient safety including physical limitations  - Instruct patient to call for assistance with activity based on assessment  - Modify environment to reduce risk of injury  - Consider OT/PT consult to assist with strengthening/mobility  Outcome: Progressing     Problem: PAIN - ADULT  Goal: Verbalizes/displays adequate comfort level or baseline comfort level  Description  Interventions:  - Encourage patient to monitor pain and request assistance  - Assess pain using appropriate pain scale  - Administer analgesics based on type and severity of pain and evaluate response  - Implement non-pharmacological measures as appropriate and evaluate response  - Consider cultural and social influences on pain and pain management  - Notify physician/advanced practitioner if interventions unsuccessful or patient reports new pain  Outcome: Progressing     Problem: INFECTION - ADULT  Goal: Absence or prevention of progression during hospitalization  Description  INTERVENTIONS:  - Assess and monitor for signs and symptoms of infection  - Monitor lab/diagnostic results  - Monitor all insertion sites, i e  indwelling lines, tubes, and drains  - [place] appropriate cooling/warming therapies per order  - Administer medications as ordered  - Instruct and encourage patient and family to use good hand hygiene technique  - Identify and instruct in appropriate isolation precautions for identified infection/condition   Outcome: Progressing     Problem: SAFETY ADULT  Goal: Maintain or return to baseline ADL function  Description  INTERVENTIONS:  -  Assess patient's ability to carry out ADLs; assess patient's baseline for ADL function and identify physical deficits which impact ability to perform ADLs (bathing, care of mouth/teeth, toileting, grooming, dressing, etc )  - Assess/evaluate cause of self-care deficits   - Assess range of motion  - Assess patient's mobility; develop plan if impaired  - Assess patient's need for assistive devices and provide as appropriate  - Encourage maximum independence but intervene and supervise when necessary  - Involve family in performance of ADLs  - Assess for home care needs following discharge   - Consider OT consult to assist with ADL evaluation and planning for discharge  - Provide patient education as appropriate  Outcome: Progressing  Goal: Maintain or return mobility status to optimal level  Description  INTERVENTIONS:  - Assess patient's baseline mobility status (ambulation, transfers, stairs, etc )    - Identify cognitive and physical deficits and behaviors that affect mobility  - Identify mobility aids required to assist with transfers and/or ambulation (gait belt, sit-to-stand, lift, walker, cane, etc )  - [place] fall precautions as indicated by assessment  - Record patient progress and toleration of activity level on Mobility SBAR; progress patient to next Phase/Stage  - Instruct patient to call for assistance with activity based on assessment  - Consider rehabilitation consult to assist with strengthening/weightbearing, etc   Outcome: Progressing     Problem: DISCHARGE PLANNING  Goal: Discharge to home or other facility with appropriate resources  Description  INTERVENTIONS:  - Identify barriers to discharge w/patient and caregiver  - Arrange for needed discharge resources and transportation as appropriate  - Identify discharge learning needs (meds, wound care, etc )  - Arrange for interpretive services to assist at discharge as needed  - Refer to Case Management Department for coordinating discharge planning if the patient needs post-hospital services based on physician/advanced practitioner order or complex needs related to functional status, cognitive ability, or social support system  Outcome: Progressing     Problem: Knowledge Deficit  Goal: Patient/family/caregiver demonstrates understanding of disease process, treatment plan, medications, and discharge instructions  Description  Complete learning assessment and assess knowledge base  Interventions:  - Provide teaching at level of understanding  - Provide teaching via preferred learning methods  Outcome: Progressing     Problem: Neurological Deficit  Goal: Neurological status is stable or improving  Description  Interventions:  - Monitor and assess patient's level of consciousness, motor function, sensory function, and level of assistance needed for ADLs  - Monitor and report changes from baseline  Collaborate with interdisciplinary team to initiate plan and implement interventions as ordered  - Provide and maintain a safe environment  - Consider seizure precautions  - Consider fall precautions  - Consider aspiration precautions  - Consider bleeding precautions  Outcome: Progressing     Problem:  Activity Intolerance/Impaired Mobility  Goal: Mobility/activity is maintained at optimum level for patient  Description  Interventions:  - Assess and monitor patient  barriers to mobility and need for assistive/adaptive devices  - Assess patient's emotional response to limitations  - Collaborate with interdisciplinary team and initiate plans and interventions as ordered  - Encourage independent activity per ability   - Maintain proper body alignment  - Perform active/passive rom as tolerated/ordered  - Plan activities to conserve energy   - Turn patient as appropriate  Outcome: Progressing     Problem: Communication Impairment  Goal: Ability to express needs and understand communication  Description  Assess patient's communication skills and ability to understand information  Patient will demonstrate use of effective communication techniques, alternative methods of communication and understanding even if not able to speak  - Encourage communication and provide alternate methods of communication as needed  - Collaborate with case management/ for discharge needs  - Include patient/family/caregiver in decisions related to communication  Outcome: Progressing     Problem: Potential for Aspiration  Goal: Non-ventilated patient's risk of aspiration is minimized  Description  Assess and monitor vital signs, respiratory status, and labs (WBC)  Monitor for signs of aspiration (tachypnea, cough, rales, wheezing, cyanosis, fever)  - Assess and monitor patient's ability to swallow  - Place patient up in chair to eat if possible  - HOB up at 90 degrees to eat if unable to get patient up into chair   - Supervise patient during oral intake  - Instruct patient/ family to take small bites  - Instruct patient/ family to take small single sips when taking liquids    - Follow patient-specific strategies generated by speech pathologist   Outcome: Progressing     Problem: Nutrition  Goal: Nutrition/Hydration status is improving  Description  Monitor and assess patient's nutrition/hydration status for malnutrition (ex- brittle hair, bruises, dry skin, pale skin and conjunctiva, muscle wasting, smooth red tongue, and disorientation)  Collaborate with interdisciplinary team and initiate plan and interventions as ordered  Monitor patient's weight and dietary intake as ordered or per policy  Utilize nutrition screening tool and intervene per policy  Determine patient's food preferences and provide high-protein, high-caloric foods as appropriate  - Assist patient with eating   - Allow adequate time for meals   - Encourage patient to take dietary supplement as ordered  - Collaborate with clinical nutritionist   - Include patient/family/caregiver in decisions related to nutrition  Outcome: Progressing     Problem: Nutrition/Hydration-ADULT  Goal: Nutrient/Hydration intake appropriate for improving, restoring or maintaining nutritional needs  Description  Monitor and assess patient's nutrition/hydration status for malnutrition  Collaborate with interdisciplinary team and initiate plan and interventions as ordered  Monitor patient's weight and dietary intake as ordered or per policy  Utilize nutrition screening tool and intervene as necessary  Determine patient's food preferences and provide high-protein, high-caloric foods as appropriate       INTERVENTIONS:  - Monitor oral intake, urinary output, labs, and treatment plans  - Assess nutrition and hydration status and recommend course of action  - Evaluate amount of meals eaten  - Assist patient with eating if necessary   - Allow adequate time for meals  - Recommend/ encourage appropriate diets, oral nutritional supplements, and vitamin/mineral supplements  - Order, calculate, and assess calorie counts as needed  - Recommend, monitor, and adjust tube feedings and TPN/PPN based on assessed needs  - Assess need for intravenous fluids  - Provide specific nutrition/hydration education as appropriate  - Include patient/family/caregiver in decisions related to nutrition  Outcome: Progressing     Problem: DISCHARGE PLANNING - CARE MANAGEMENT  Goal: Discharge to post-acute care or home with appropriate resources  Description  INTERVENTIONS:  - Conduct assessment to determine patient/family and health care team treatment goals, and need for post-acute services based on payer coverage, community resources, and patient preferences, and barriers to discharge  - Address psychosocial, clinical, and financial barriers to discharge as identified in assessment in conjunction with the patient/family and health care team  - Arrange appropriate level of post-acute services according to patient's   needs and preference and payer coverage in collaboration with the physician and health care team  - Communicate with and update the patient/family, physician, and health care team regarding progress on the discharge plan   Pt will benefit from STR vs Home care  Pt lives with his wife     - Arrange appropriate transportation to post-acute venues   Outcome: Progressing

## 2019-12-26 NOTE — PHYSICAL THERAPY NOTE
PHYSICAL THERAPY NOTE          Patient Name: Hussein LEONG Date: 12/26/2019 12/26/19 0292   Restrictions/Precautions   Other Precautions   (Chair Alarm; Bed Alarm;Multiple lines;Telemetry; Fall Risk)   Cognition   Overall Cognitive Status Impaired   Arousal/Participation Alert; Cooperative   Following Commands Follows one step commands with increased time or repetition   Bed Mobility   Supine to Sit 2  Maximal assistance   Additional items Assist x 2;HOB elevated; Bedrails; Increased time required;Verbal cues;LE management   Transfers   Sit to Stand 2  Maximal assistance   Additional items Assist x 2; Increased time required;Verbal cues   Stand to Sit 2  Maximal assistance   Additional items Assist x 2; Increased time required;Verbal cues   Stand pivot 2  Maximal assistance   Additional items Assist x 2;Verbal cues; Increased time required   Additional Comments Attempted transfer to chair with RW  Unable to complete  Transfered to chair SPT max x 2 without device   Ambulation/Elevation   Gait pattern   (not appropriate, not tested)   Exercises   Heelslides Supine;20 reps;AAROM; Bilateral   Hip Flexion Supine;20 reps;AAROM; Bilateral   Hip Abduction Supine;25 reps;AAROM; Bilateral   Hip Adduction Supine;20 reps;AAROM; Bilateral   Knee AROM Long Arc Quad Sitting;20 reps;AAROM   Ankle Pumps Supine;Sitting;20 reps;AAROM; Bilateral   Assessment   Prognosis Fair   Problem List Decreased strength;Decreased endurance; Impaired balance;Decreased mobility; Decreased cognition; Impaired judgement;Decreased safety awareness   Assessment Pt seen for PT treatment session this date with interventions consisting of Therapeutic exercise consisting of: AAROM 20 reps B LE in supine and sitting  position and therapeutic activity consisting of training: supine<>sit transfers, sit<>stand transfers and stand pivot transfer bed to recliner   Increased time to complete tasks  In comparison to previous session, Pt  With minimal change in activity tolerance  From PT/mobility standpoint, recommendation at time of d/c would be STR  in order to promote return to PLOF and independence  Goals   LTG Expiration Date 01/06/19   PT Treatment Day 2   Plan   Treatment/Interventions Functional transfer training;LE strengthening/ROM; Therapeutic exercise; Endurance training;Bed mobility;Gait training   Progress Slow progress, decreased activity tolerance   Recommendation   Recommendation Short-term skilled PT   Pt  OOB in chair  with call bell within reach, scd's connected and turned on and alarm on at end of PT session  Discussed with Deric Abdullahi, PT today's treatment and patient's current level of function for care coordination

## 2019-12-26 NOTE — ASSESSMENT & PLAN NOTE
Patient's daughter notes transient confusion  Per daughter Reino Guardian- patient is not back to baseline  Suspect this is metabolic encephalopathy in the setting of advanced dementia  Urine culture: No Growth <1000 cfu/mL  TSH normal  B12 noted  Will consult neurology given no improvement

## 2019-12-26 NOTE — DISCHARGE INSTRUCTIONS
Patient will be discharged from the hospital with an indwelling [place] catheter  This remain in place for at least 1 week  The patient will need follow-up in the office with urology in 7-10 days for catheter removal and voiding trial   Please call to set up an appointment date and time, Urology telephone number is [phone]       Nilsa Mcgrath PA-C

## 2019-12-26 NOTE — PLAN OF CARE
Problem: PHYSICAL THERAPY ADULT  Goal: Performs mobility at highest level of function for planned discharge setting  See evaluation for individualized goals  Description  Treatment/Interventions: Functional transfer training, LE strengthening/ROM, Elevations, Therapeutic exercise, Endurance training, Bed mobility, Gait training          See flowsheet documentation for full assessment, interventions and recommendations  Outcome: Progressing  Note:   Prognosis: Fair  Problem List: Decreased strength, Decreased endurance, Impaired balance, Decreased mobility, Decreased cognition, Impaired judgement, Decreased safety awareness  Assessment: Pt seen for PT treatment session this date with interventions consisting of Therapeutic exercise consisting of: AAROM 20 reps B LE in supine and sitting  position and therapeutic activity consisting of training: supine<>sit transfers, sit<>stand transfers and stand pivot transfer bed to recliner  Increased time to complete tasks  In comparison to previous session, Pt  With minimal change in activity tolerance  From PT/mobility standpoint, recommendation at time of d/c would be STR  in order to promote return to PLOF and independence  Recommendation: Short-term skilled PT         See flowsheet documentation for full assessment

## 2019-12-26 NOTE — SOCIAL WORK
No bed on the 5th floor for pt, pt was accepted at [facility] for when he is medically ready for dc

## 2019-12-26 NOTE — ASSESSMENT & PLAN NOTE
Resolved  Creatinine at 1 65 on admission, now back to normal at 0 83  No documented hx of CKD  Avoid nephrotoxic agents  Follow BMP daily

## 2019-12-26 NOTE — ASSESSMENT & PLAN NOTE
Patient has acute on chronic dysarthria  Possible TIA vs  Metabolic encephalopathy  CTA shows-1   No acute intracranial hemorrhage, midline shift, or mass effect  2   Chronic small vessel ischemic changes  3   If there is continued clinical concern for acute infarct, further evaluation with MRI may be obtained which is more sensitive  4   80% narrowing of the left internal carotid artery just distal to the bifurcation  5   Mild to moderate atherosclerotic narrowing of the cavernous and supraclinoid portions of the bilateral internal carotid arteries  6   Mild focal narrowing of the intracranial right vertebral artery and moderate short segment narrowing of the intracranial left vertebral artery  7   No high-grade proximal stenosis of the visualized [place]  8   No significant intracranial arteriovenous malformation or aneurysm  MRI brain:   Chronic lacunar infarct in the left centrum semiovale  No mass, mass effect, midline shift  No hemorrhage identified  No evidence of recent infarct  Chronic lacunar infarcts bilateral cerebellum  Small scattered hyperintensities   on T2/FLAIR imaging are noted in the periventricular and subcortical white matter demonstrating an appearance that is statistically most likely to represent mild microangiopathic change  Continue stroke pathway  Telemetry monitoring - no a fib noted on telemetry thus far  Speech eval appreciated - pureed diet with nectar thick liquids recommended  Continue Daily aspirin, Lipitor given stenosis of L ICA  Echocardiogram - unremarkable  Check A1C, lipid panel - both well controlled  Monitor blood pressure closely - so far is controlled well  OT,PT speech eval - short term rehab recommended  Outpatient follow up with vascular surgery re: carotid stenosis    Consult neurology

## 2019-12-26 NOTE — ASSESSMENT & PLAN NOTE
He is currently on Prilosec for 20 mg p o  Daily  Continue formulary alternative Protonix 40 mg p o   Daily

## 2019-12-26 NOTE — ASSESSMENT & PLAN NOTE
Patient continues to have urinary retention  Urology consultation appreciated  Cam[place] catheter placed today 12/26    Flomax started by Urology today [id number]

## 2019-12-26 NOTE — ASSESSMENT & PLAN NOTE
Patient found to have urinary retention, confusion, and came with reports of a fall    UA shows-4-10 WBCs, innumerable bacteria, moderate blood  Leukocytosis also present  CT abdomen and pelvis shows- 4   Marked bladder distention  Started on IV ceftriaxone in the ER, this was continued on admission  Urine cultures: No Growth <1000 cfu/mL  Blood cultures no growth at 48 hours  Will discontinue IV antibiotics  Patient failed voiding trial and urology consulted     [place] catheter placed today 12/26

## 2019-12-26 NOTE — PLAN OF CARE
Problem: Potential for Falls  Goal: Patient will remain free of falls  Description  INTERVENTIONS:  - Assess patient frequently for physical needs  -  Identify cognitive and physical deficits and behaviors that affect risk of falls    -  [place] fall precautions as indicated by assessment   - Educate patient/family on patient safety including physical limitations  - Instruct patient to call for assistance with activity based on assessment  - Modify environment to reduce risk of injury  - Consider OT/PT consult to assist with strengthening/mobility  Outcome: Progressing     Problem: SAFETY ADULT  Goal: Maintain or return to baseline ADL function  Description  INTERVENTIONS:  -  Assess patient's ability to carry out ADLs; assess patient's baseline for ADL function and identify physical deficits which impact ability to perform ADLs (bathing, care of mouth/teeth, toileting, grooming, dressing, etc )  - Assess/evaluate cause of self-care deficits   - Assess range of motion  - Assess patient's mobility; develop plan if impaired  - Assess patient's need for assistive devices and provide as appropriate  - Encourage maximum independence but intervene and supervise when necessary  - Involve family in performance of ADLs  - Assess for home care needs following discharge   - Consider OT consult to assist with ADL evaluation and planning for discharge  - Provide patient education as appropriate  Outcome: Progressing     Problem: SAFETY ADULT  Goal: Maintain or return mobility status to optimal level  Description  INTERVENTIONS:  - Assess patient's baseline mobility status (ambulation, transfers, stairs, etc )    - Identify cognitive and physical deficits and behaviors that affect mobility  - Identify mobility aids required to assist with transfers and/or ambulation (gait belt, sit-to-stand, lift, walker, cane, etc )  - [place] fall precautions as indicated by assessment  - Record patient progress and toleration of activity level on Mobility SBAR; progress patient to next Phase/Stage  - Instruct patient to call for assistance with activity based on assessment  - Consider rehabilitation consult to assist with strengthening/weightbearing, etc   Outcome: Progressing     Problem: Neurological Deficit  Goal: Neurological status is stable or improving  Description  Interventions:  - Monitor and assess patient's level of consciousness, motor function, sensory function, and level of assistance needed for ADLs  - Monitor and report changes from baseline  Collaborate with interdisciplinary team to initiate plan and implement interventions as ordered  - Provide and maintain a safe environment  - Consider seizure precautions  - Consider fall precautions  - Consider aspiration precautions  - Consider bleeding precautions    Outcome: Progressing     Problem: DISCHARGE PLANNING  Goal: Discharge to home or other facility with appropriate resources  Description  INTERVENTIONS:  - Identify barriers to discharge w/patient and caregiver  - Arrange for needed discharge resources and transportation as appropriate  - Identify discharge learning needs (meds, wound care, etc )  - Arrange for interpretive services to assist at discharge as needed  - Refer to Case Management Department for coordinating discharge planning if the patient needs post-hospital services based on physician/advanced practitioner order or complex needs related to functional status, cognitive ability, or social support system  Outcome: Progressing

## 2019-12-26 NOTE — SPEECH THERAPY NOTE
Speech/Language Pathology Progress Note    Patient Name: Jc vEans  Today's Date: 12/26/2019     Subjective:  Pt was alert and awake, sitting upright in chair at bedside pulling at pulse ox and catheter  Speech mumbled however pt is clearly frustrated  Objective:  Pt seen for diagnostic swallow therapy  Current diet is puree and NTL  Discussed with RN, pt is taking his meds but appetite/intake is poor  Pt was offered trial of mech soft (chopped banana) and thin liquids  Mastication of bananas was incomplete and oral residual was noted after swallow, with no attempt to clear  Thin liquid via cup sip resulted in significant L side spillage  Trial of thin was offered via straw, unable to draw through straw initially but successful when placed on R side of mouth  Cough response noted with thin liquid via straw  NTL via cup sip and puree tolerated with no s/s aspiration, and no complaints about consistency  Assessment:  Unable to safely advance diet/liquids at this time  Plan/Recommendations:  Continue puree diet and NTL  ST will continue to follow

## 2019-12-27 VITALS
DIASTOLIC BLOOD PRESSURE: 82 MMHG | WEIGHT: 153.66 LBS | SYSTOLIC BLOOD PRESSURE: 135 MMHG | HEART RATE: 108 BPM | HEIGHT: 70 IN | OXYGEN SATURATION: 97 % | BODY MASS INDEX: 22 KG/M2 | RESPIRATION RATE: 18 BRPM | TEMPERATURE: 98.1 F

## 2019-12-27 LAB
ANION GAP SERPL CALCULATED.3IONS-SCNC: 8 MMOL/L (ref 4–13)
BASOPHILS # BLD AUTO: 0.02 THOUSANDS/ΜL (ref 0–0.1)
BASOPHILS NFR BLD AUTO: 0 % (ref 0–1)
BUN SERPL-MCNC: 18 MG/DL (ref 5–25)
CALCIUM SERPL-MCNC: 8.7 MG/DL (ref 8.3–10.1)
CHLORIDE SERPL-SCNC: 110 MMOL/L (ref 100–108)
CO2 SERPL-SCNC: 25 MMOL/L (ref 21–32)
CREAT SERPL-MCNC: 0.86 MG/DL (ref 0.6–1.3)
EOSINOPHIL # BLD AUTO: 0.07 THOUSAND/ΜL (ref 0–0.61)
EOSINOPHIL NFR BLD AUTO: 1 % (ref 0–6)
ERYTHROCYTE [DISTWIDTH] IN BLOOD BY AUTOMATED COUNT: 12.1 % (ref 11.6–15.1)
GFR SERPL CREATININE-BSD FRML MDRD: 85 ML/MIN/1.73SQ M
GLUCOSE SERPL-MCNC: 103 MG/DL (ref 65–140)
HCT VFR BLD AUTO: 41.7 % (ref 36.5–49.3)
HGB BLD-MCNC: 13.9 G/DL (ref 12–17)
IMM GRANULOCYTES # BLD AUTO: 0.04 THOUSAND/UL (ref 0–0.2)
IMM GRANULOCYTES NFR BLD AUTO: 0 % (ref 0–2)
LYMPHOCYTES # BLD AUTO: 1.49 THOUSANDS/ΜL (ref 0.6–4.47)
LYMPHOCYTES NFR BLD AUTO: 17 % (ref 14–44)
MCH RBC QN AUTO: 30.6 PG (ref 26.8–34.3)
MCHC RBC AUTO-ENTMCNC: 33.3 G/DL (ref 31.4–37.4)
MCV RBC AUTO: 92 FL (ref 82–98)
MONOCYTES # BLD AUTO: 0.82 THOUSAND/ΜL (ref 0.17–1.22)
MONOCYTES NFR BLD AUTO: 9 % (ref 4–12)
NEUTROPHILS # BLD AUTO: 6.52 THOUSANDS/ΜL (ref 1.85–7.62)
NEUTS SEG NFR BLD AUTO: 73 % (ref 43–75)
NRBC BLD AUTO-RTO: 0 /100 WBCS
PLATELET # BLD AUTO: 209 THOUSANDS/UL (ref 149–390)
PMV BLD AUTO: 10.5 FL (ref 8.9–12.7)
POTASSIUM SERPL-SCNC: 3.5 MMOL/L (ref 3.5–5.3)
RBC # BLD AUTO: 4.54 MILLION/UL (ref 3.88–5.62)
SODIUM SERPL-SCNC: 143 MMOL/L (ref 136–145)
WBC # BLD AUTO: 8.96 THOUSAND/UL (ref 4.31–10.16)

## 2019-12-27 PROCEDURE — 99238 HOSP IP/OBS DSCHRG MGMT 30/<: CPT | Performed by: PHYSICIAN ASSISTANT

## 2019-12-27 PROCEDURE — 85025 COMPLETE CBC W/AUTO DIFF WBC: CPT | Performed by: PHYSICIAN ASSISTANT

## 2019-12-27 PROCEDURE — 97530 THERAPEUTIC ACTIVITIES: CPT

## 2019-12-27 PROCEDURE — G0426 INPT/ED TELECONSULT50: HCPCS | Performed by: PSYCHIATRY & NEUROLOGY

## 2019-12-27 PROCEDURE — 92526 ORAL FUNCTION THERAPY: CPT

## 2019-12-27 PROCEDURE — 80048 BASIC METABOLIC PNL TOTAL CA: CPT | Performed by: PHYSICIAN ASSISTANT

## 2019-12-27 PROCEDURE — 97110 THERAPEUTIC EXERCISES: CPT

## 2019-12-27 RX ORDER — ASPIRIN 81 MG/1
81 TABLET, CHEWABLE ORAL DAILY
Qty: 30 TABLET | Refills: 0 | Status: SHIPPED | OUTPATIENT
Start: 2019-12-28

## 2019-12-27 RX ORDER — TAMSULOSIN HYDROCHLORIDE 0.4 MG/1
0.4 CAPSULE ORAL
Qty: 30 CAPSULE | Refills: 0 | Status: SHIPPED | OUTPATIENT
Start: 2019-12-27

## 2019-12-27 RX ORDER — ATORVASTATIN CALCIUM 40 MG/1
40 TABLET, FILM COATED ORAL EVERY EVENING
Qty: 30 TABLET | Refills: 0 | Status: SHIPPED | OUTPATIENT
Start: 2019-12-27

## 2019-12-27 RX ADMIN — ENOXAPARIN SODIUM 40 MG: 40 INJECTION SUBCUTANEOUS at 08:37

## 2019-12-27 RX ADMIN — ASPIRIN 81 MG 81 MG: 81 TABLET ORAL at 08:37

## 2019-12-27 NOTE — DISCHARGE SUMMARY
Discharge- Ngozi Clay 1945, 75 y o  male MRN: [id number]    Unit/Bed#: 426-01 Encounter: [id number]    Primary Care Provider: No primary care provider on file  Date and time admitted to hospital: 12/22/2019  4:16 PM        Acute metabolic encephalopathy  Assessment & Plan  Patient's daughter notes transient confusion  Per daughter Carolina Castellano- patient is not back to baseline  Urine culture: No Growth <1000 cfu/mL  TSH normal  B12 noted  Neurology consulted given no improvement per family  Neurology felt this may be due to early onset dementia vs possible parkinsons given MRI brain negative for acute findings  The patient was discharged to [facility] for short-term rehab  MRI brain neuroquant with and without contrast ordered for outpatient per Neurology recommendations  The patient will also need to follow up with outpatient movement disorder team with either Dr Johan Mehta or Dr Rocky Garcia  * Dysarthria  Assessment & Plan  Patient has acute on chronic dysarthria  Likely due to early onset dementia vs possible parkinsons    CTA shows-1   No acute intracranial hemorrhage, midline shift, or mass effect  2   Chronic small vessel ischemic changes  3   If there is continued clinical concern for acute infarct, further evaluation with MRI may be obtained which is more sensitive  4   80% narrowing of the left internal carotid artery just distal to the bifurcation  5   Mild to moderate atherosclerotic narrowing of the cavernous and supraclinoid portions of the bilateral internal carotid arteries  6   Mild focal narrowing of the intracranial right vertebral artery and moderate short segment narrowing of the intracranial left vertebral artery  7   No high-grade proximal stenosis of the visualized [place]  8   No significant intracranial arteriovenous malformation or aneurysm  MRI brain:   Chronic lacunar infarct in the left centrum semiovale  No mass, mass effect, midline shift    No hemorrhage identified  No evidence of recent infarct  Chronic lacunar infarcts bilateral cerebellum  Small scattered hyperintensities   on T2/FLAIR imaging are noted in the periventricular and subcortical white matter demonstrating an appearance that is statistically most likely to represent mild microangiopathic change  Speech eval appreciated - pureed diet with nectar thick liquids recommended  Continue Daily aspirin, Lipitor given stenosis of L ICA  Echocardiogram - unremarkable  Check A1C, lipid panel - both well controlled  Monitor blood pressure closely - so far is controlled well  OT,PT speech eval - short term rehab recommended  Patient discharged to [facility]    Outpatient follow-up with vascular surgery re: carotid stenosis  Outpatient follow-up with Neurology  KURT (acute kidney injury) ([facility] [address] )  Assessment & Plan  Resolved  Creatinine at 1 65 on admission, now back to normal at 0 86  No documented hx of CKD      Generalized weakness  Assessment & Plan  Likely multifactorial   The patient was discharged to [facility] for short-term rehab  Abnormal urinalysis  Assessment & Plan  Patient found to have urinary retention, confusion, and came with reports of a fall    UA shows-4-10 WBCs, innumerable bacteria, moderate blood  Leukocytosis also present  CT abdomen and pelvis shows- Marked bladder distention  Started on IV ceftriaxone in the ER, this was continued on admission  Urine cultures: No Growth <1000 cfu/mL  Blood cultures no growth at 48 hours  IV antibiotics discontinued on 12/26/19, no additional antibiotics needed on discharge  Patient failed voiding trial and urology consulted  Cam[place] catheter placed today 12/26  Outpatient follow-up with urology in 1 week  Urinary retention  Assessment & Plan  Patient continues to have urinary retention  Urology consultation appreciated  Cam[place] catheter placed on 12/26    Flomax started by Urology 12/26 this was continued on discharge  Camargo catheter to remain in place x 1 week, patient will need to follow-up with Urology in the office for a voiding trial      Essential hypertension  Assessment & Plan  Blood pressure is stable  Continue PTA medciations    Gastroesophageal reflux disease without esophagitis  Assessment & Plan  Continue Prilosec for 20 mg p o  Daily      Hypernatremia  Assessment & Plan  Resolved  Likely in the setting of volume depletion         Discharging Physician / Practitioner: Gail Castorena PA-C  PCP: No primary care provider on file  Admission Date:   Admission Orders (From admission, onward)     Ordered        12/22/19 1822  Inpatient Admission  Once                   Discharge Date: 12/27/19    Resolved Problems  Date Reviewed: 12/27/2019    None          Consultations During Hospital Stay:  · Tele-neurology    Procedures Performed:   · none    Significant Findings / Test Results:   Cta Head And Neck With And Without Contrast    Result Date: 12/22/2019  Impression: 1  No acute intracranial hemorrhage, midline shift, or mass effect  2   Chronic small vessel ischemic changes  3   If there is continued clinical concern for acute infarct, further evaluation with MRI may be obtained which is more sensitive  4   80% narrowing of the left internal carotid artery just distal to the bifurcation  5   Mild to moderate atherosclerotic narrowing of the cavernous and supraclinoid portions of the bilateral internal carotid arteries  6   Mild focal narrowing of the intracranial right vertebral artery and moderate short segment narrowing of the intracranial left vertebral artery  7   No high-grade proximal stenosis of the visualized [place]  8   No significant intracranial arteriovenous malformation or aneurysm  I personally discussed this study with Cherise Garner on 12/22/2019 at 6:03 PM  Workstation performed: [id number]     Ct Chest Abdomen Pelvis Wo Contrast    Result Date: 12/22/2019  Impression: 1    No definite acute visceral and/or osseous injury in the chest, abdomen, or pelvis  2   Debris in the proximal trachea likely mucous secretions  3   Abundant stool in the rectum with secondary stercoral colitis  4   Marked bladder distention  Workstation performed: [id number]     Ct Cervical Spine Without Contrast    Result Date: 12/22/2019  Impression: No cervical spine fracture or traumatic malalignment  Moderate multilevel degenerative changes most pronounced at C5-C6 where there is severe neural foraminal narrowing on the left and moderate neural foraminal narrowing on the right  Workstation performed: [id number]     Mri Brain Wo Contrast    Result Date: 12/23/2019  · Impression: No acute intracranial abnormality  Chronic lacunar infarct left centrum semiovale  Workstation performed: [id number]   ·     Incidental Findings:   · none     Test Results Pending at Discharge (will require follow up):   · none     Outpatient Tests Requested:  · none    Complications:  none    Reason for Admission: change in mental status, stroke-like symptoms    Hospital Course:     Jeane Lombardi is a 75 y o  male patient who originally presented to the hospital on 12/22/2019 due to generalized weakness and slurred speech and increased confusion over last 2-3 days getting progressively worse  His family reports that he has been having several falls over the past couple of weeks most recent was about 3 days ago hitting his head however he had no LOC  His wife also reports that he had increased weakness to his lower extremities decrease in his ability to ambulate as well as he has not been eating or drinking very much over the last few days  His past medical history significant for GERD and hypertension  His family also reports that he has been evaluated at the [facility] for possible dementia, Alzheimer's, Parkinson's  He also reports that he had several CT scans, MRI as well as EEG    He was also prescribed galantamine which was discontinued by his family as they thought that it was contributing to his complaints of headaches  He received normal saline bolus 500 cc in the ER  He was also started on ceftriaxone 1 g IV  At bedside he is awake obviously confused  He does not formulate any specific complaints  The patient has been admitted on inpatient status [facility] care for further workup and management stroke-like symptoms, acute cystitis, acute kidney injury       Please see above list of diagnoses and related plan for additional information  Condition at Discharge: good     Discharge Day Visit / Exam:     Subjective:    Vitals: Blood Pressure: 135/82 (12/27/19 0737)  Pulse: (!) 108 (12/27/19 0737)  Temperature: 98 1 °F (36 7 °C) (12/27/19 0737)  Temp Source: Axillary (12/26/19 2212)  Respirations: 18 (12/27/19 0737)  Height: 5' 10" (177 8 cm) (12/23/19 1507)  Weight - Scale: 69 7 kg (153 lb 10 6 oz) (12/27/19 0600)  SpO2: 97 % (12/27/19 0737)  Exam:   Physical Exam   HENT:   Head: Normocephalic and atraumatic  Cardiovascular: Normal rate, regular rhythm and normal heart sounds  Pulmonary/Chest: Effort normal and breath sounds normal  He has no wheezes  He has no rales  Abdominal: Soft  Bowel sounds are normal  He exhibits no distension  There is no tenderness  Neurological: He is alert  The patient is not oriented to place or time  Skin: Skin is warm and dry  Nursing note and vitals reviewed  Discussion with Family: family updated at bedside prior to discharge    Discharge instructions/Information to patient and family:   See after visit summary for information provided to patient and family  Provisions for Follow-Up Care:  See after visit summary for information related to follow-up care and any pertinent home health orders        Disposition:     [facility] to [facility]:   · Not Applicable to this Patient - Not Applicable to this Patient    Planned Readmission: no     Discharge Statement:  I spent 25 minutes discharging the patient  This time was spent on the day of discharge  I had direct contact with the patient on the day of discharge  Greater than 50% of the total time was spent examining patient, answering all patient questions, arranging and discussing plan of care with patient as well as directly providing post-discharge instructions  Additional time then spent on discharge activities  Discharge Medications:  See after visit summary for reconciled discharge medications provided to patient and family        ** Please Note: This note has been constructed using a voice recognition system **

## 2019-12-27 NOTE — PLAN OF CARE
Problem: OCCUPATIONAL THERAPY ADULT  Goal: Performs self-care activities at highest level of function for planned discharge setting  See evaluation for individualized goals  Description  Treatment Interventions: ADL retraining, Functional transfer training, UE strengthening/ROM, Endurance training, Equipment evaluation/education, Patient/family training, Cognitive reorientation, Activityengagement          See flowsheet documentation for full assessment, interventions and recommendations  Outcome: Progressing  Note:   Limitation: Decreased ADL status, Decreased UE strength, Decreased Safe judgement during ADL, Decreased cognition, Decreased endurance, Decreased self-care trans, Decreased high-level ADLs     Assessment: Pt agreeable to skilled OT treatment session to address functional mobility and functional transfers  Throughout session pt required max verbal and tactile cues to complete bed mobility, and supine to sit  Pt required mod A to maintain sitting balance on EOB  Pt required max VC to hold onto bed rails instead of therapist's leg  Pt required max A x2 to SPT to chair  Once seated in chair pt requested water, pt provided thickened liquids per diet, required max VC to encourage pt to hold cup, pt unable to hold in hand despite hand over hand guidance  Pt would continue to benefit from skilled OT in order to address deficits in ADL, functional mobility, functional transfers, decreased cognitive status, and decreased endurance       OT Discharge Recommendation: Short Term Rehab  OT - OK to Discharge: (when medically cleared)

## 2019-12-27 NOTE — ASSESSMENT & PLAN NOTE
Patient has acute on chronic dysarthria  Likely due to early onset dementia vs possible parkinsons    CTA shows-1   No acute intracranial hemorrhage, midline shift, or mass effect  2   Chronic small vessel ischemic changes  3   If there is continued clinical concern for acute infarct, further evaluation with MRI may be obtained which is more sensitive  4   80% narrowing of the left internal carotid artery just distal to the bifurcation  5   Mild to moderate atherosclerotic narrowing of the cavernous and supraclinoid portions of the bilateral internal carotid arteries  6   Mild focal narrowing of the intracranial right vertebral artery and moderate short segment narrowing of the intracranial left vertebral artery  7   No high-grade proximal stenosis of the visualized [place]  8   No significant intracranial arteriovenous malformation or aneurysm  MRI brain:   Chronic lacunar infarct in the left centrum semiovale  No mass, mass effect, midline shift  No hemorrhage identified  No evidence of recent infarct  Chronic lacunar infarcts bilateral cerebellum  Small scattered hyperintensities   on T2/FLAIR imaging are noted in the periventricular and subcortical white matter demonstrating an appearance that is statistically most likely to represent mild microangiopathic change  Speech eval appreciated - pureed diet with nectar thick liquids recommended  Continue Daily aspirin, Lipitor given stenosis of L ICA  Echocardiogram - unremarkable  Check A1C, lipid panel - both well controlled  Monitor blood pressure closely - so far is controlled well  OT,PT speech eval - short term rehab recommended  Patient discharged to [facility]    Outpatient follow-up with vascular surgery re: carotid stenosis  Outpatient follow-up with Neurology

## 2019-12-27 NOTE — TELEMEDICINE
TeleConsultation - Neurology   Martina Green 75 y o  male MRN: [id number]  Unit/Bed#: 426-01 Encounter: [id number]      REQUIRED DOCUMENTATION:     1  This service was provided via Telemedicine  2  Provider located at [place] [address]  TeleMed provider: Antonina Parikh MD   4  Identify all parties in room with patient during tele consult:  none  5  After connecting through televideo, patient was identified by name and date of birth and assistant checked wristband  Patient was then informed that this was a Telemedicine visit and that the exam was being conducted confidentially over secure lines  My office door was closed  No one else was in the room  Patient acknowledged consent and understanding of privacy and security of the Telemedicine visit, and gave us permission to have the assistant stay in the room in order to assist with the history and to conduct the exam   I informed the patient that I have reviewed their record in Epic and presented the opportunity for them to ask any questions regarding the visit today  The patient agreed to participate  Assessment/Plan   Assessment:  Patient has been having slurred speech, confusion, short term memory loss and balance issues leading to 5 falls for a few months now  MRI brain which I personally reviewed the images of was negative for any acute findings, no evidence of ischemic stroke  Etiology at this time is concerned for early onset dementia vs possible parkinsons? Plan:  -he will need MRI brain neuroquant Outpatient  -also might need neuropsych testing  -recommend that he follow up with outpatient movement disorder team next available (either Dr Rashid Avendaño or Dr Clinton Siu)  -continue with outpatient therapy     No additional recommendations at this time  History of Present Illness     Reason for Consult / Principal Problem: slurred speech and balance issues  Hx and PE limited by: patient disoriented and lethargic  HPI: Martina Green is a 75 y o  right handed male who presents with falls  He had slurred speech and balance issues for the past month  History was obtained primarily from the patient  He came in because he had stiffness and had fallen 5 times  He fell 14 days ago and then he did not move at all and then he has been in bed for 4 days and then he was not eating, drinking or doing anything  Before all of this he has been doing well, but he was ambulating with a walker and does seem to be shuffling  He has had some weight loss  He also some episodes of confusion  He could not find his way to the bathroom  He does not know where he was  He has had some memory issues as well in the past but now has gotten worse  He does have a history of early dementia and he is not on any medications for them  He has not had a neurologist outpatient  Consults    Review of Systems   Constitutional: Negative  HENT: Negative  Eyes: Negative  Respiratory: Negative  Cardiovascular: Negative  Gastrointestinal: Negative  Endocrine: Negative  Genitourinary: Negative  Musculoskeletal: Negative  Skin: Negative  Allergic/Immunologic: Negative  Neurological: Positive for weakness and light-headedness  Psychiatric/Behavioral: Positive for agitation and decreased concentration  All other systems reviewed and are negative  Historical Information   Past Medical History:   Diagnosis Date    GERD (gastroesophageal reflux disease)     Hypertension      History reviewed  No pertinent surgical history  Social History   Social History     Substance and Sexual Activity   Alcohol Use Never    Frequency: Never     Social History     Substance and Sexual Activity   Drug Use Never     Social History     Tobacco Use   Smoking Status Former Smoker   Smokeless Tobacco Never Used     Family History: History reviewed  No pertinent family history  Review of previous medical records was completed       Meds/Allergies   current meds: Current Facility-Administered Medications   Medication Dose Route Frequency    aspirin chewable tablet 81 mg  81 mg Oral Daily    atorvastatin (LIPITOR) tablet 40 mg  40 mg Oral QPM    enoxaparin (LOVENOX) subcutaneous injection 40 mg  40 mg Subcutaneous Q24H SHANNON    iohexol (OMNIPAQUE) 350 MG/ML injection (SINGLE-DOSE) 100 mL  100 mL Intravenous Once in imaging    iohexol (OMNIPAQUE) 350 MG/ML injection (SINGLE-DOSE) 70 mL  70 mL Intravenous Once in imaging    ondansetron (ZOFRAN) injection 4 mg  4 mg Intravenous Q6H PRN    tamsulosin (FLOMAX) capsule 0 4 mg  0 4 mg Oral Daily With Dinner       Allergies   Allergen Reactions    Penicillins        Objective   Vitals:Blood pressure 135/82, pulse (!) 108, temperature 98 1 °F (36 7 °C), resp  rate 18, height 5' 10" (1 778 m), weight 69 7 kg (153 lb 10 6 oz), SpO2 97 %  ,Body mass index is 22 05 kg/m²  Intake/Output Summary (Last 24 hours) at 12/27/2019 0942  Last data filed at 12/27/2019 0401  Gross per 24 hour   Intake 180 ml   Output 1250 ml   Net -1070 ml       Invasive Devices: Invasive Devices     Peripheral Intravenous Line            Peripheral IV 12/23/19 Left;Dorsal (posterior) Forearm 3 days          Drain            Urethral Catheter Coude 16 Fr  less than 1 day              Physical Exam   General: Patient is not in any acute/apparent distress, well nourished, well developed  HEENT: normocephalic, atraumatic, moist membranes  Neck: supple  Heart: regular heart rate and rhythm, no murmurs, rubs and or gallops  Chest: Clear to auscultation bilaterally  Abdomen: soft and non-tender  Extremities: no edema noted   Skin: no lesions or rash  Musculosketal: no bony abnormalities    Neurologic Examination:   Mental status: lethargic, and did not follow much commands, and stated that he wanted to sleep  Speech/Language: said a few words, and he does have dysarthria, but did not participate in much of the language testing       Cranial Nerves:   CN I: smell not tested  CN II: Visual fields full to confrontation  CN III, IV, VI: Extraocular movements intact bilaterally  Pupils equal round and reactive to light bilaterally  CN V: Facial sensation is normal   CN VII: Full and symmetric facial movement  CN VIII: Hearing is normal   CN IX, X: Palate elevates symmetrically  Normal gag reflex  CN XI: Shoulder shrug strength is normal   CN XII: Tongue midline without atrophy or fasciculations  Motor:   Strength 5/5 bilateral UE and 4/5 bilateral LE  Sensory:   Sensation intact to soft touch throughout     Cerebellar:   Finger-to-nose intact, normal heel to shin  Reflexes: no reflex hammer  Pathologic reflexes -deferred due to nurse performing the exam      Gait:   deferred    Lab Results:   I have personally reviewed pertinent reports  , CBC:   Results from last 7 days   Lab Units 12/27/19  0501 12/26/19  0432 12/25/19  0523   WBC Thousand/uL 8 96 8 40 8 97   RBC Million/uL 4 54 4 49 4 38   HEMOGLOBIN g/dL 13 9 13 8 13 7   HEMATOCRIT % 41 7 40 9 40 5   MCV fL 92 91 93   PLATELETS Thousands/uL 209 206 183   , BMP/CMP:   Results from last 7 days   Lab Units 12/27/19  0501 12/26/19  0432 12/25/19  0523  12/23/19  0517 12/22/19  1629   SODIUM mmol/L 143 144 145   < > 149* 144   POTASSIUM mmol/L 3 5 3 4* 3 3*   < > 3 4* 3 7   CHLORIDE mmol/L 110* 111* 110*   < > 112* 105   CO2 mmol/L 25 24 24   < > 25 26   CO2, I-STAT mmol/L  --   --   --   --   --  26   BUN mg/dL 18 18 15   < > 33* 49*   CREATININE mg/dL 0 86 0 86 0 80   < > 1 01 1 68*   GLUCOSE, ISTAT mg/dl  --   --   --   --   --  134   CALCIUM mg/dL 8 7 8 7 8 6   < > 8 8 9 6   AST U/L  --   --   --   --  34 33   ALT U/L  --   --   --   --  45 51   ALK PHOS U/L  --   --   --   --  76 91   EGFR ml/min/1 73sq m 85 85 87   < > 72 45  39    < > = values in this interval not displayed     , Vitamin B12:   Results from last 7 days   Lab Units 12/24/19  0622   VITAMIN B 12 pg/mL 1,378*   , HgBA1C:   Results from last 7 days   Lab Units 12/23/19  0517   HEMOGLOBIN A1C % 5 0   , TSH:   Results from last 7 days   Lab Units 12/24/19  0622 12/22/19  1629   TSH 3RD GENERATON uIU/mL 1 066 0 481   , Coagulation:   Results from last 7 days   Lab Units 12/22/19  1629   INR  1 21*   , Lipid Profile:   Results from last 7 days   Lab Units 12/23/19  0517   HDL mg/dL 41   LDL CALC mg/dL 53   TRIGLYCERIDES mg/dL 54   , Ammonia:   , Urinalysis:   Results from last 7 days   Lab Units 12/22/19  1729   COLOR UA  Alicia   CLARITY UA  Slightly Cloudy   SPEC GRAV UA  1 025   PH UA  5 5   LEUKOCYTES UA  Negative   NITRITE UA  Negative   GLUCOSE UA mg/dl Negative   KETONES UA mg/dl Negative   BILIRUBIN UA  Interference- unable to analyze*   BLOOD UA  Moderate*   , Drug Screen:   Results from last 7 days   Lab Units 12/22/19  1731   BARBITURATE UR  Negative   BENZODIAZEPINE UR  Negative   THC UR  Negative   COCAINE UR  Negative   METHADONE URINE  Negative   OPIATE UR  Negative   PCP UR  Negative   , Medication Drug Levels:       Invalid input(s): CARBAMAZEPINE,  PHENOBARB, LACOSAMIDE, OXCARBAZEPINE  Imaging Studies: I have personally reviewed pertinent films in PACS  EKG, Pathology, and Other Studies: I have personally reviewed pertinent reports      VTE Prophylaxis: Sequential compression device [place])     Code Status: Level 1 - Full Code  Advance Directive and Living Will:      Power of :    POLST:      Counseling / Coordination of Care  N/A

## 2019-12-27 NOTE — SOCIAL WORK
Met with pt's wife and daughter:Meli samuel am who are agreeable to [facility] on dc for pt as there is not a bed on the 5th floor  Family aware pt is a probable dc today to [facility]

## 2019-12-27 NOTE — ASSESSMENT & PLAN NOTE
Patient's daughter notes transient confusion  Per daughter Brenden Martinez- patient is not back to baseline  Urine culture: No Growth <1000 cfu/mL  TSH normal  B12 noted  Neurology consulted given no improvement per family  Neurology felt this may be due to early onset dementia vs possible parkinsons given MRI brain negative for acute findings  The patient was discharged to [facility] for short-term rehab  MRI brain neuroquant with and without contrast ordered for outpatient per Neurology recommendations  The patient will also need to follow up with outpatient movement disorder team with either Dr Rashid Avendaño or Dr Clinton Siu

## 2019-12-27 NOTE — ASSESSMENT & PLAN NOTE
Patient found to have urinary retention, confusion, and came with reports of a fall    UA shows-4-10 WBCs, innumerable bacteria, moderate blood  Leukocytosis also present  CT abdomen and pelvis shows- Marked bladder distention  Started on IV ceftriaxone in the ER, this was continued on admission  Urine cultures: No Growth <1000 cfu/mL  Blood cultures no growth at 48 hours  IV antibiotics discontinued on 12/26/19, no additional antibiotics needed on discharge  Patient failed voiding trial and urology consulted  Camargo catheter placed today 12/26  Outpatient follow-up with urology in 1 week

## 2019-12-27 NOTE — PHYSICAL THERAPY NOTE
PHYSICAL THERAPY NOTE          Patient Name: Kelly Fitzgerald  FPRLR'I Date: 12/27/2019 12/27/19 8902   Restrictions/Precautions   Other Precautions   (Chair Alarm; Bed Alarm;Cognitive;Telemetry; Fall Risk)   Cognition   Overall Cognitive Status Impaired   Arousal/Participation Alert; Cooperative   Following Commands Follows one step commands with increased time or repetition   Subjective   Subjective Agreeable to therapy   Bed Mobility   Supine to Sit 2  Maximal assistance   Additional items Assist x 2;HOB elevated; Increased time required;Verbal cues;LE management   Transfers   Sit to Stand 2  Maximal assistance   Additional items Assist x 2; Increased time required;Verbal cues   Stand to Sit 2  Maximal assistance   Additional items Assist x 2; Increased time required;Verbal cues   Stand pivot 2  Maximal assistance   Additional items Assist x 2; Increased time required;Verbal cues   Balance   Static Sitting Fair -   Dynamic Sitting Fair -   Static Standing Poor -   Dynamic Standing Poor -   Endurance Deficit   Endurance Deficit Yes   Activity Tolerance   Activity Tolerance Patient limited by fatigue   Exercises   Heelslides Supine;20 reps;AAROM   Hip Flexion Sitting;20 reps;AAROM   Hip Abduction Sitting;20 reps; Supine;AAROM   Hip Adduction Supine;Sitting;20 reps   Knee AROM Long Arc Quad Sitting;20 reps;AAROM   Ankle Pumps Supine;Sitting;20 reps;AAROM;AROM   Assessment   Prognosis Fair   Problem List Decreased strength;Decreased endurance; Impaired balance;Decreased mobility; Decreased cognition; Impaired judgement;Decreased safety awareness   Assessment Pt  Currently performing  tx and ambulation at (max ) x 2 level of function  Transfer training to increase functional task performance and  to promote safe sit/stand and stand/sit mobility  Pt  education  for hand postion and safety and technique with transfer training    In comparison to previous session, Pt  With improvements in activity tolerance  Pt is in need of continued activity in PT to improve strength balance endurance mobility transfers and ambulation with return to maximize LOF  From PT/mobility standpoint, recommendation at time of d/c would be STR  in order to promote return to PLOF and independence  Goals   LTG Expiration Date 01/06/20   PT Treatment Day 3   Plan   Treatment/Interventions Functional transfer training;LE strengthening/ROM; Therapeutic exercise; Endurance training;Bed mobility;Gait training   Progress Slow progress, decreased activity tolerance   Recommendation   Recommendation Short-term skilled PT   Pt  OOB in chair with call bell within reach, scd's connected and turned on and alarm on at end of PT session  Discussed with Seven Will, PT today's treatment and patient's current level of function for care coordination

## 2019-12-27 NOTE — SPEECH THERAPY NOTE
Speech/Language Pathology Progress Note    Patient Name: Sven Ruby  Today's Date: 12/27/2019     Subjective:  Pt was awake and alert, sitting up in chair at bedside  Objective:  Pt seen for diagnostic swallow tx  Diet is puree and NTL  PCA reported decreased intake, however when SLP fed pt he did not refuse  Some difficulty with NTL retrieval via straw, and cup sips felt to be more effective  No s/s aspiration with single cup sips initially, however A-P transfer remains delayed/slowed and swallow initiation also felt to be delayed  Attempted slightly faster rate of feeding, offering next bolus several seconds after swallow of previous, and this resulted in mild coughing  When fed more slowly pt is able to swallow x2, suspecting to clear pharyngeal residual      Assessment:  Pt benefits from slow rate of intake, feeding by staff, and cups sips rather than straw  Plan/Recommendations:  Continue puree/NTL diet  ST will continue to follow

## 2019-12-27 NOTE — PLAN OF CARE
Problem: Potential for Falls  Goal: Patient will remain free of falls  Description  INTERVENTIONS:  - Assess patient frequently for physical needs  -  Identify cognitive and physical deficits and behaviors that affect risk of falls  -  [place] fall precautions as indicated by assessment   - Educate patient/family on patient safety including physical limitations  - Instruct patient to call for assistance with activity based on assessment  - Modify environment to reduce risk of injury  - Consider OT/PT consult to assist with strengthening/mobility  Outcome: Progressing     Problem: SAFETY ADULT  Goal: Maintain or return to baseline ADL function  Description  INTERVENTIONS:  -  Assess patient's ability to carry out ADLs; assess patient's baseline for ADL function and identify physical deficits which impact ability to perform ADLs (bathing, care of mouth/teeth, toileting, grooming, dressing, etc )  - Assess/evaluate cause of self-care deficits   - Assess range of motion  - Assess patient's mobility; develop plan if impaired  - Assess patient's need for assistive devices and provide as appropriate  - Encourage maximum independence but intervene and supervise when necessary  - Involve family in performance of ADLs  - Assess for home care needs following discharge   - Consider OT consult to assist with ADL evaluation and planning for discharge  - Provide patient education as appropriate  Outcome: Progressing     Problem: Activity Intolerance/Impaired Mobility  Goal: Mobility/activity is maintained at optimum level for patient  Description  Interventions:  - Assess and monitor patient  barriers to mobility and need for assistive/adaptive devices  - Assess patient's emotional response to limitations  - Collaborate with interdisciplinary team and initiate plans and interventions as ordered  - Encourage independent activity per ability   - Maintain proper body alignment    - Perform active/passive rom as tolerated/ordered  - Plan activities to conserve energy   - Turn patient as appropriate  Outcome: Progressing     Problem: Potential for Aspiration  Goal: Non-ventilated patient's risk of aspiration is minimized  Description  Assess and monitor vital signs, respiratory status, and labs (WBC)  Monitor for signs of aspiration (tachypnea, cough, rales, wheezing, cyanosis, fever)  - Assess and monitor patient's ability to swallow  - Place patient up in chair to eat if possible  - HOB up at 90 degrees to eat if unable to get patient up into chair   - Supervise patient during oral intake  - Instruct patient/ family to take small bites  - Instruct patient/ family to take small single sips when taking liquids  - Follow patient-specific strategies generated by speech pathologist   Outcome: Progressing     Problem: Nutrition/Hydration-ADULT  Goal: Nutrient/Hydration intake appropriate for improving, restoring or maintaining nutritional needs  Description  Monitor and assess patient's nutrition/hydration status for malnutrition  Collaborate with interdisciplinary team and initiate plan and interventions as ordered  Monitor patient's weight and dietary intake as ordered or per policy  Utilize nutrition screening tool and intervene as necessary  Determine patient's food preferences and provide high-protein, high-caloric foods as appropriate       INTERVENTIONS:  - Monitor oral intake, urinary output, labs, and treatment plans  - Assess nutrition and hydration status and recommend course of action  - Evaluate amount of meals eaten  - Assist patient with eating if necessary   - Allow adequate time for meals  - Recommend/ encourage appropriate diets, oral nutritional supplements, and vitamin/mineral supplements  - Order, calculate, and assess calorie counts as needed  - Recommend, monitor, and adjust tube feedings and TPN/PPN based on assessed needs  - Assess need for intravenous fluids  - Provide specific nutrition/hydration education as appropriate  - Include patient/family/caregiver in decisions related to nutrition  Outcome: Progressing     Problem: DISCHARGE PLANNING - CARE MANAGEMENT  Goal: Discharge to post-acute care or home with appropriate resources  Description  INTERVENTIONS:  - Conduct assessment to determine patient/family and health care team treatment goals, and need for post-acute services based on payer coverage, community resources, and patient preferences, and barriers to discharge  - Address psychosocial, clinical, and financial barriers to discharge as identified in assessment in conjunction with the patient/family and health care team  - Arrange appropriate level of post-acute services according to patient's   needs and preference and payer coverage in collaboration with the physician and health care team  - Communicate with and update the patient/family, physician, and health care team regarding progress on the discharge plan   Pt will benefit from STR vs Home care  Pt lives with his wife     - Arrange appropriate transportation to post-acute venues   Outcome: Progressing

## 2019-12-27 NOTE — NURSING NOTE
[facility] given via phone by SEDA Soler  PIV removed  Assisted patient to wheelchair aside transporter  ALL paperwork given to transporter

## 2019-12-27 NOTE — OCCUPATIONAL THERAPY NOTE
OccupationalTherapy Progress Note     Patient Name: Romy Castanon  XZZQS'M Date: 12/27/2019  Problem List  Principal Problem:    Dysarthria  Active Problems:    KURT (acute kidney injury) [facility])    Essential hypertension    Gastroesophageal reflux disease without esophagitis    Abnormal urinalysis    Generalized weakness    Acute metabolic encephalopathy    Hypernatremia    Urinary retention              12/27/19 5918   Restrictions/Precautions   Weight Bearing Precautions Per Order No   Other Precautions Chair Alarm; Bed Alarm;Cognitive;Telemetry; Fall Risk   Pain Assessment   Pain Assessment FLACC   Pain Rating: FLACC (Rest) - Face 0   Pain Rating: FLACC (Rest) - Legs 0   Pain Rating: FLACC (Rest) - Activity 0   Pain Rating: FLACC (Rest) - Cry 0   Pain Rating: FLACC (Rest) - Consolability 0   Score: FLACC (Rest) 0   Pain Rating: FLACC (Activity) - Face 0   Pain Rating: FLACC (Activity) - Legs 0   Pain Rating: FLACC (Activity) - Activity 0   Pain Rating: FLACC (Activity) - Cry 0   Pain Rating: FLACC (Activity) - Consolability 0   Score: FLACC (Activity) 0   ADL   Eating Assistance 3  Moderate Assistance   Eating Deficit Bringing food to mouth assist;Increased time to complete; Thickened liquids   Grooming Deficit   (unable to follow commands to bring brush to head)   Bed Mobility   Supine to Sit 2  Maximal assistance   Additional items Assist x 2; Increased time required;Verbal cues;LE management; Bedrails   Additional Comments Pt supine in bed upon arrival, seated in chair at the end of the session, alarm on and call bell within reach   Transfers   Sit to Stand 2  Maximal assistance   Additional items Assist x 2; Increased time required;Verbal cues   Stand to Sit 2  Maximal assistance   Additional items Assist x 2; Increased time required;Verbal cues   Stand pivot 2  Maximal assistance   Additional items Assist x 2;Verbal cues; Increased time required   Additional Comments transfer to chair with SPT, max A x2 Functional Mobility   Additional Comments unable to take steps    Cognition   Overall Cognitive Status Impaired   Arousal/Participation Alert; Cooperative   Attention Attends with cues to redirect   Orientation Level Oriented to person;Disoriented to place; Disoriented to time;Disoriented to situation   Memory Decreased long term memory;Decreased short term memory   Following Commands Follows one step commands with increased time or repetition   Activity Tolerance   Activity Tolerance Patient tolerated treatment well   Assessment   Assessment Pt agreeable to skilled OT treatment session to address functional mobility and functional transfers  Throughout session pt required max verbal and tactile cues to complete bed mobility, and supine to sit  Pt required mod A to maintain sitting balance on EOB  Pt required max VC to hold onto bed rails instead of therapist's leg  Pt required max A x2 to SPT to chair  Once seated in chair pt requested water, pt provided thickened liquids per diet, required max VC to encourage pt to hold cup, pt unable to hold in hand despite hand over hand guidance  Pt would continue to benefit from skilled OT in order to address deficits in ADL, functional mobility, functional transfers, decreased cognitive status, and decreased endurance     Plan   Goal Expiration Date 01/06/20   OT Treatment Day 2   OT Frequency 3-5x/wk   Recommendation   OT Discharge Recommendation Short Term Rehab   OT - OK to Discharge   (when medically cleared)       OTILIA Hodges/L

## 2019-12-27 NOTE — TELEPHONE ENCOUNTER
Tino Hilliard is a 50-year-old male seen in consultation at [facility] for urinary retention status post Camargo catheter placement  Please contact patient/caregiver with hospital follow-up for void trial in approximately 7--10 days  Thank you

## 2019-12-28 LAB
BACTERIA BLD CULT: NORMAL
BACTERIA BLD CULT: NORMAL
VIT B1 BLD-SCNC: 102 NMOL/L (ref 66.5–200)

## 2019-12-30 NOTE — TELEPHONE ENCOUNTER
Is it ok to wait until the Tuesday, the 7th and with you? I think he's in [employer]  Will they remove the hoyos and then see you in the afternoon? Please let me know

## 2019-12-30 NOTE — TELEPHONE ENCOUNTER
Marcia asking for order to remove hoyos in AM   Patient scheduled for 1/7/2020, 2:15 pm   Fax order to [facility], [phone]

## 2019-12-30 NOTE — TELEPHONE ENCOUNTER
Patient is a new patient to urology, needs provider visit  Have [address] [facility] remove hoyos then schedule appointment with Jaquelin Garcia for an afternoon PVR  Next week is fine

## 2020-01-03 NOTE — TELEPHONE ENCOUNTER
Order fax to [facility] asking clinical to remove patient's hoyos the morning of January 7 2020 at 0800  Confirmation received    Patient has an appointment with urology same day at 215 pm

## 2020-01-07 ENCOUNTER — OFFICE VISIT (OUTPATIENT)
Dept: UROLOGY | Facility: CLINIC | Age: 75
End: 2020-01-07
Payer: MEDICARE

## 2020-01-07 VITALS
DIASTOLIC BLOOD PRESSURE: 60 MMHG | SYSTOLIC BLOOD PRESSURE: 104 MMHG | WEIGHT: 180.8 LBS | HEART RATE: 86 BPM | BODY MASS INDEX: 25.94 KG/M2

## 2020-01-07 DIAGNOSIS — R33.9 URINE RETENTION: Primary | ICD-10-CM

## 2020-01-07 LAB — POST-VOID RESIDUAL VOLUME, ML POC: 64 ML

## 2020-01-07 PROCEDURE — 99214 OFFICE O/P EST MOD 30 MIN: CPT | Performed by: PHYSICIAN ASSISTANT

## 2020-01-07 PROCEDURE — 51798 US URINE CAPACITY MEASURE: CPT | Performed by: PHYSICIAN ASSISTANT

## 2020-01-07 NOTE — PROGRESS NOTES
1/7/2020      Chief Complaint   Patient presents with    Urinary Urgency    Urinary Retention         Assessment and Plan    75 y o  male managed by NEW PATIENT    1  Urinary retention  - during hospitalization, failed urinary retention protocol as well as initial failed void trial on day three    Void trial today with Camargo catheter removed this morning @ [facility] unknown time, bladder scan currently 64 mL in office  Not eaten or drink today and has not voided  This is an incomplete trial though no indication of present to place catheter  Recommend repeat bladder scan in 4-6 hours after patient has eaten/drank  Replace catheter if bladder volumes greater than 350 cc and unable to void  History of Present Illness  Kymberly Freeman is a 75 y o  male here for evaluation of hospital follow-up void trial   Patient recently hospitalized from 65/79/11-49/49/9711 for metabolic encephalopathy  He does have some underlying neurologic impairment  Thorough neurologic workup was performed with no new findings  Was empirically treated for urinary tract infection given bacteriuria and pyuria however culture was no growth  Patient with urinary retention, Camargo catheter placed 12/22, failed void trial 12/26 through today  Camargo catheter removed this morning at [facility] unknown time but the plan was 7:00 a m  Patient states he has not voided since the catheter was removed, the states he has not eaten or drank anything since that time either  BladderScan is 64 mL current  Prior to this episode patient had no voiding difficulties and no prior catheterizations  He is taking Flomax since hospital discharge  Patient's daughters are concerned about his general decline over the past month, they feel he is not back to his previous baseline and in fact is getting worse  They intend to take him back to the hospital today for re-evaluation  Patient does appear somewhat dry  Patient complains of "my ass hurts"    He does have a early sacral pressure wound on examination today  He states he was sitting yesterday in the his when the pain started  Last time he walked with a walker was last week by his report  He denies chest pain, shortness of breath, fevers, chills, nausea, vomiting, diarrhea  He denies the urge to urinate or defecate  He denies headache, dizziness, tinnitus or vision change  He feels his words are sluggish  He states does not want to eat/drink s/t to thickened/puree diet  Review of Systems   Constitutional: Positive for activity change, appetite change and fatigue  Negative for fever  Respiratory: Negative for choking and shortness of breath  Cardiovascular: Negative for chest pain, palpitations and leg swelling  Gastrointestinal: Negative for abdominal pain  Genitourinary: Positive for decreased urine volume and difficulty urinating  Negative for dysuria, flank pain and urgency  Past Medical History  Past Medical History:   Diagnosis Date    GERD (gastroesophageal reflux disease)     Hypertension      Past Social History  History reviewed  No pertinent surgical history  Social History     Tobacco Use   Smoking Status Former Smoker   Smokeless Tobacco Never Used     Past Family History  History reviewed  No pertinent family history    Past Social history  Social History     Socioeconomic History    Marital status: /Civil Union     Spouse name: Not on file    Number of children: Not on file    Years of education: Not on file    Highest education level: Not on file   Occupational History    Not on file   Social Needs    Financial resource strain: Not on file    Food insecurity:     Worry: Not on file     Inability: Not on file    Transportation needs:     Medical: Not on file     Non-medical: Not on file   Tobacco Use    Smoking status: Former Smoker    Smokeless tobacco: Never Used   Substance and Sexual Activity    Alcohol use: Never     Frequency: Never    Drug use: Never    Sexual activity: Not on file   Lifestyle    Physical activity:     Days per week: Not on file     Minutes per session: Not on file    Stress: Not on file   Relationships    Social connections:     Talks on phone: Not on file     Gets together: Not on file     Attends Nondenominational service: Not on file     Active member of club or organization: Not on file     Attends meetings of clubs or organizations: Not on file     Relationship status: Not on file    Intimate partner violence:     Fear of current or ex partner: Not on file     Emotionally abused: Not on file     Physically abused: Not on file     Forced sexual activity: Not on file   Other Topics Concern    Not on file   Social History Narrative    Not on file     Current Medications  Current Outpatient Medications   Medication Sig Dispense Refill    allopurinol (ZYLOPRIM) 300 mg tablet Take 300 mg by mouth daily      amLODIPine (NORVASC) 10 mg tablet Take 10 mg by mouth daily      aspirin 81 mg chewable tablet Chew 1 tablet (81 mg total) daily 30 tablet 0    atorvastatin (LIPITOR) 40 mg tablet Take 1 tablet (40 mg total) by mouth every evening 30 tablet 0    cyanocobalamin (VITAMIN B-12) 1000 MCG tablet Take 1,000 mcg by mouth daily      omeprazole (PriLOSEC) 20 mg delayed release capsule Take 20 mg by mouth daily      tamsulosin (FLOMAX) 0 4 mg Take 1 capsule (0 4 mg total) by mouth daily with dinner 30 capsule 0     No current facility-administered medications for this visit  Allergies  Allergies   Allergen Reactions    Penicillins          The following portions of the patient's history were reviewed and updated as appropriate: allergies, current medications, past medical history, past social history, past surgical history and problem list       Vitals  Vitals:    01/07/20 1421   BP: 104/60   Pulse: 86   Weight: 82 kg (180 lb 12 8 oz)       Physical Exam   Constitutional: He is oriented to person, place, and time     Chronically ill appearing, appears older than stated age  Awake and answers questions appropriately and quickly but trails off to sleep if conversation not upheld   HENT:   Head: Normocephalic and atraumatic  Tacky mucous membranes   Cardiovascular: Normal rate, regular rhythm and normal heart sounds  No murmur heard  Pulmonary/Chest: Effort normal and breath sounds normal  He has no wheezes  He has no rales  Abdominal: Soft  Bowel sounds are normal  He exhibits no distension  There is no tenderness  There is no guarding  Genitourinary:   Genitourinary Comments: Circumcised penis  Scant yellow discharge at urethral meatus  Testes descended, normal  Scrotal skin normal   Small <1cm sacral pressure wound in midline   Musculoskeletal: Normal range of motion  He exhibits no edema  Neurological: He is alert and oriented to person, place, and time  Dysarthria- baseline per daughter at bedside  Answers are quick and appropriate otherwise  Skin: Skin is warm and dry  Capillary refill takes less than 2 seconds  No pallor  dry   Nursing note and vitals reviewed          Results  Recent Results (from the past 1 hour(s))   POCT Measure PVR    Collection Time: 01/07/20  2:25 PM   Result Value Ref Range    POST-VOID RESIDUAL VOLUME, ML POC 64 mL   ]  No results found for: PSA  Lab Results   Component Value Date    GLUCOSE 134 12/22/2019    CALCIUM 8 7 12/27/2019    K 3 5 12/27/2019    CO2 25 12/27/2019     (H) 12/27/2019    BUN 18 12/27/2019    CREATININE 0 86 12/27/2019     Lab Results   Component Value Date    WBC 8 96 12/27/2019    HGB 13 9 12/27/2019    HCT 41 7 12/27/2019    MCV 92 12/27/2019     12/27/2019         Orders  Orders Placed This Encounter   Procedures    POCT Measure PVR

## 2020-08-31 ENCOUNTER — APPOINTMENT (EMERGENCY)
Dept: RADIOLOGY | Facility: HOSPITAL | Age: 75
DRG: 057 | End: 2020-08-31
Payer: COMMERCIAL

## 2020-08-31 ENCOUNTER — APPOINTMENT (EMERGENCY)
Dept: CT IMAGING | Facility: HOSPITAL | Age: 75
DRG: 057 | End: 2020-08-31
Payer: COMMERCIAL

## 2020-08-31 ENCOUNTER — HOSPITAL ENCOUNTER (INPATIENT)
Facility: HOSPITAL | Age: 75
LOS: 1 days | Discharge: HOME HEALTH | DRG: 057 | End: 2020-09-01
Attending: EMERGENCY MEDICINE | Admitting: INTERNAL MEDICINE
Payer: COMMERCIAL

## 2020-08-31 DIAGNOSIS — R41.82 ALTERED MENTAL STATUS: ICD-10-CM

## 2020-08-31 DIAGNOSIS — F03.90 DEMENTIA (HCC): ICD-10-CM

## 2020-08-31 DIAGNOSIS — R26.2 AMBULATORY DYSFUNCTION: Primary | ICD-10-CM

## 2020-08-31 LAB
ALBUMIN SERPL BCP-MCNC: 4.4 G/DL (ref 3.5–5)
ALP SERPL-CCNC: 89 U/L (ref 46–116)
ALT SERPL W P-5'-P-CCNC: 68 U/L (ref 12–78)
ANION GAP SERPL CALCULATED.3IONS-SCNC: 10 MMOL/L (ref 4–13)
APAP SERPL-MCNC: <2 UG/ML (ref 10–20)
APTT PPP: 27 SECONDS (ref 23–37)
AST SERPL W P-5'-P-CCNC: 18 U/L (ref 5–45)
BASOPHILS # BLD AUTO: 0.03 THOUSANDS/ΜL (ref 0–0.1)
BASOPHILS NFR BLD AUTO: 0 % (ref 0–1)
BILIRUB SERPL-MCNC: 0.7 MG/DL (ref 0.2–1)
BILIRUB UR QL STRIP: NEGATIVE
BUN SERPL-MCNC: 14 MG/DL (ref 5–25)
CALCIUM SERPL-MCNC: 9.1 MG/DL (ref 8.3–10.1)
CHLORIDE SERPL-SCNC: 102 MMOL/L (ref 100–108)
CLARITY UR: CLEAR
CO2 SERPL-SCNC: 28 MMOL/L (ref 21–32)
COLOR UR: YELLOW
CREAT SERPL-MCNC: 0.95 MG/DL (ref 0.6–1.3)
EOSINOPHIL # BLD AUTO: 0.08 THOUSAND/ΜL (ref 0–0.61)
EOSINOPHIL NFR BLD AUTO: 1 % (ref 0–6)
ERYTHROCYTE [DISTWIDTH] IN BLOOD BY AUTOMATED COUNT: 12.7 % (ref 11.6–15.1)
ETHANOL SERPL-MCNC: <3 MG/DL (ref 0–3)
GFR SERPL CREATININE-BSD FRML MDRD: 77 ML/MIN/1.73SQ M
GLUCOSE SERPL-MCNC: 116 MG/DL (ref 65–140)
GLUCOSE UR STRIP-MCNC: NEGATIVE MG/DL
HCT VFR BLD AUTO: 43.4 % (ref 36.5–49.3)
HGB BLD-MCNC: 15 G/DL (ref 12–17)
HGB UR QL STRIP.AUTO: NEGATIVE
IMM GRANULOCYTES # BLD AUTO: 0.03 THOUSAND/UL (ref 0–0.2)
IMM GRANULOCYTES NFR BLD AUTO: 0 % (ref 0–2)
INR PPP: 1.12 (ref 0.84–1.19)
KETONES UR STRIP-MCNC: NEGATIVE MG/DL
LACTATE SERPL-SCNC: 1.7 MMOL/L (ref 0.5–2)
LEUKOCYTE ESTERASE UR QL STRIP: NEGATIVE
LYMPHOCYTES # BLD AUTO: 1.52 THOUSANDS/ΜL (ref 0.6–4.47)
LYMPHOCYTES NFR BLD AUTO: 19 % (ref 14–44)
MAGNESIUM SERPL-MCNC: 2 MG/DL (ref 1.6–2.6)
MCH RBC QN AUTO: 31.6 PG (ref 26.8–34.3)
MCHC RBC AUTO-ENTMCNC: 34.6 G/DL (ref 31.4–37.4)
MCV RBC AUTO: 92 FL (ref 82–98)
MONOCYTES # BLD AUTO: 0.68 THOUSAND/ΜL (ref 0.17–1.22)
MONOCYTES NFR BLD AUTO: 8 % (ref 4–12)
NEUTROPHILS # BLD AUTO: 5.72 THOUSANDS/ΜL (ref 1.85–7.62)
NEUTS SEG NFR BLD AUTO: 72 % (ref 43–75)
NITRITE UR QL STRIP: NEGATIVE
NRBC BLD AUTO-RTO: 0 /100 WBCS
PH UR STRIP.AUTO: 6 [PH]
PLATELET # BLD AUTO: 171 THOUSANDS/UL (ref 149–390)
PMV BLD AUTO: 9.8 FL (ref 8.9–12.7)
POTASSIUM SERPL-SCNC: 3.8 MMOL/L (ref 3.5–5.3)
PROT SERPL-MCNC: 7.6 G/DL (ref 6.4–8.2)
PROT UR STRIP-MCNC: NEGATIVE MG/DL
PROTHROMBIN TIME: 14.2 SECONDS (ref 11.6–14.5)
RBC # BLD AUTO: 4.74 MILLION/UL (ref 3.88–5.62)
SALICYLATES SERPL-MCNC: <3 MG/DL (ref 3–20)
SODIUM SERPL-SCNC: 140 MMOL/L (ref 136–145)
SP GR UR STRIP.AUTO: 1.02 (ref 1–1.03)
TROPONIN I SERPL-MCNC: <0.02 NG/ML
TSH SERPL DL<=0.05 MIU/L-ACNC: 1.32 UIU/ML (ref 0.36–3.74)
UROBILINOGEN UR QL STRIP.AUTO: 0.2 E.U./DL
WBC # BLD AUTO: 8.06 THOUSAND/UL (ref 4.31–10.16)

## 2020-08-31 PROCEDURE — 99223 1ST HOSP IP/OBS HIGH 75: CPT | Performed by: NURSE PRACTITIONER

## 2020-08-31 PROCEDURE — G1004 CDSM NDSC: HCPCS

## 2020-08-31 PROCEDURE — 80320 DRUG SCREEN QUANTALCOHOLS: CPT | Performed by: PHYSICIAN ASSISTANT

## 2020-08-31 PROCEDURE — 80329 ANALGESICS NON-OPIOID 1 OR 2: CPT | Performed by: PHYSICIAN ASSISTANT

## 2020-08-31 PROCEDURE — 99285 EMERGENCY DEPT VISIT HI MDM: CPT | Performed by: PHYSICIAN ASSISTANT

## 2020-08-31 PROCEDURE — 80053 COMPREHEN METABOLIC PANEL: CPT | Performed by: PHYSICIAN ASSISTANT

## 2020-08-31 PROCEDURE — 85730 THROMBOPLASTIN TIME PARTIAL: CPT | Performed by: PHYSICIAN ASSISTANT

## 2020-08-31 PROCEDURE — 83735 ASSAY OF MAGNESIUM: CPT | Performed by: PHYSICIAN ASSISTANT

## 2020-08-31 PROCEDURE — 93005 ELECTROCARDIOGRAM TRACING: CPT

## 2020-08-31 PROCEDURE — 36415 COLL VENOUS BLD VENIPUNCTURE: CPT | Performed by: PHYSICIAN ASSISTANT

## 2020-08-31 PROCEDURE — 99285 EMERGENCY DEPT VISIT HI MDM: CPT

## 2020-08-31 PROCEDURE — 83605 ASSAY OF LACTIC ACID: CPT | Performed by: PHYSICIAN ASSISTANT

## 2020-08-31 PROCEDURE — 81003 URINALYSIS AUTO W/O SCOPE: CPT | Performed by: PHYSICIAN ASSISTANT

## 2020-08-31 PROCEDURE — 70450 CT HEAD/BRAIN W/O DYE: CPT

## 2020-08-31 PROCEDURE — 87040 BLOOD CULTURE FOR BACTERIA: CPT | Performed by: PHYSICIAN ASSISTANT

## 2020-08-31 PROCEDURE — 85610 PROTHROMBIN TIME: CPT | Performed by: PHYSICIAN ASSISTANT

## 2020-08-31 PROCEDURE — 85025 COMPLETE CBC W/AUTO DIFF WBC: CPT | Performed by: PHYSICIAN ASSISTANT

## 2020-08-31 PROCEDURE — 84484 ASSAY OF TROPONIN QUANT: CPT | Performed by: PHYSICIAN ASSISTANT

## 2020-08-31 PROCEDURE — 71045 X-RAY EXAM CHEST 1 VIEW: CPT

## 2020-08-31 PROCEDURE — 84443 ASSAY THYROID STIM HORMONE: CPT | Performed by: PHYSICIAN ASSISTANT

## 2020-08-31 RX ORDER — GALANTAMINE HYDROBROMIDE 8 MG/1
8 TABLET, FILM COATED ORAL 2 TIMES DAILY
Status: DISCONTINUED | OUTPATIENT
Start: 2020-09-01 | End: 2020-09-01

## 2020-08-31 RX ORDER — AMLODIPINE BESYLATE 10 MG/1
10 TABLET ORAL DAILY
Status: DISCONTINUED | OUTPATIENT
Start: 2020-09-01 | End: 2020-09-01 | Stop reason: HOSPADM

## 2020-08-31 RX ORDER — GALANTAMINE HYDROBROMIDE 8 MG/1
8 TABLET, FILM COATED ORAL 2 TIMES DAILY
COMMUNITY

## 2020-08-31 RX ORDER — ASPIRIN 81 MG/1
81 TABLET, CHEWABLE ORAL DAILY
Status: DISCONTINUED | OUTPATIENT
Start: 2020-09-01 | End: 2020-09-01 | Stop reason: HOSPADM

## 2020-08-31 RX ORDER — PANTOPRAZOLE SODIUM 20 MG/1
20 TABLET, DELAYED RELEASE ORAL
Status: DISCONTINUED | OUTPATIENT
Start: 2020-09-01 | End: 2020-09-01 | Stop reason: HOSPADM

## 2020-08-31 RX ORDER — ATORVASTATIN CALCIUM 40 MG/1
40 TABLET, FILM COATED ORAL EVERY EVENING
Status: DISCONTINUED | OUTPATIENT
Start: 2020-08-31 | End: 2020-09-01 | Stop reason: HOSPADM

## 2020-08-31 RX ORDER — TAMSULOSIN HYDROCHLORIDE 0.4 MG/1
0.4 CAPSULE ORAL
Status: DISCONTINUED | OUTPATIENT
Start: 2020-09-01 | End: 2020-09-01 | Stop reason: HOSPADM

## 2020-08-31 RX ORDER — ALLOPURINOL 300 MG/1
300 TABLET ORAL DAILY
Status: DISCONTINUED | OUTPATIENT
Start: 2020-09-01 | End: 2020-09-01 | Stop reason: HOSPADM

## 2020-08-31 NOTE — ED PROVIDER NOTES
History  Chief Complaint   Patient presents with    Altered Mental Status     Patient has been having altered mental status and becoming more rigid per daughter      Patient presents to the emergency department today for evaluation of altered mental status  Patient presents accompanied by his daughter  Patient lives at home with his separate daughter as well as patient's wife  Daughter states that the patient has been experiencing some visual hallucinations over the last 2 days  She feels though he is more rigid in general   This is notable when I attempted to stand the patient up from the wheelchair to get him into bed he was of very little help support his own weight  He appears slightly confused  Daughter states that he has been eating and drinking normally however there has been dark colored urine today, the other sister was noting some nighttime enuresis as well as urinary frequency over the last 2 days  Patient has no history of pain  Specifically denies chest pain shortness of breath leg pain leg edema  Prior to Admission Medications   Prescriptions Last Dose Informant Patient Reported?  Taking?   allopurinol (ZYLOPRIM) 300 mg tablet 8/31/2020 at Unknown time  Yes Yes   Sig: Take 300 mg by mouth daily   amLODIPine (NORVASC) 10 mg tablet 8/31/2020 at Unknown time  Yes Yes   Sig: Take 10 mg by mouth daily   aspirin 81 mg chewable tablet 8/31/2020 at Unknown time  No Yes   Sig: Chew 1 tablet (81 mg total) daily   atorvastatin (LIPITOR) 40 mg tablet 8/31/2020 at Unknown time  No Yes   Sig: Take 1 tablet (40 mg total) by mouth every evening   cyanocobalamin (VITAMIN B-12) 1000 MCG tablet 8/31/2020 at Unknown time  Yes Yes   Sig: Take 1,000 mcg by mouth daily   galantamine (RAZADYNE) 8 mg tablet 8/31/2020 at Unknown time  Yes Yes   Sig: Take 8 mg by mouth 2 (two) times a day   omeprazole (PriLOSEC) 20 mg delayed release capsule 8/31/2020 at Unknown time  Yes Yes   Sig: Take 20 mg by mouth daily tamsulosin (FLOMAX) 0 4 mg 8/31/2020 at Unknown time  No Yes   Sig: Take 1 capsule (0 4 mg total) by mouth daily with dinner      Facility-Administered Medications: None       Past Medical History:   Diagnosis Date    GERD (gastroesophageal reflux disease)     Hypertension        History reviewed  No pertinent surgical history  History reviewed  No pertinent family history  I have reviewed and agree with the history as documented  E-Cigarette/Vaping    E-Cigarette Use Never User      E-Cigarette/Vaping Substances     Social History     Tobacco Use    Smoking status: Former Smoker    Smokeless tobacco: Never Used   Substance Use Topics    Alcohol use: Not Currently     Frequency: Never    Drug use: Never       Review of Systems   Constitutional: Negative  Negative for activity change, appetite change, chills, diaphoresis, fatigue, fever and unexpected weight change  HENT: Negative  Negative for sore throat, trouble swallowing and voice change  Eyes: Negative  Respiratory: Negative  Negative for cough, chest tightness, shortness of breath and wheezing  Cardiovascular: Negative  Negative for chest pain, palpitations and leg swelling  Gastrointestinal: Negative  Negative for abdominal pain, blood in stool, nausea and vomiting  Endocrine: Negative  Genitourinary: Positive for frequency  Negative for flank pain and hematuria  Musculoskeletal: Negative  Negative for arthralgias, back pain, gait problem, joint swelling, myalgias, neck pain and neck stiffness  Skin: Negative  Negative for rash and wound  Allergic/Immunologic: Negative  Neurological: Negative  Negative for dizziness, seizures, syncope, weakness, light-headedness and headaches  Hematological: Negative  Psychiatric/Behavioral: Positive for confusion  All other systems reviewed and are negative  Physical Exam  Physical Exam  Vitals signs reviewed     Constitutional:       General: He is not in acute distress  Appearance: He is well-developed  He is not ill-appearing, toxic-appearing or diaphoretic  HENT:      Head: Normocephalic and atraumatic  Right Ear: External ear normal  No swelling  Tympanic membrane is not bulging  Left Ear: External ear normal  No swelling  Tympanic membrane is not bulging  Nose: Nose normal       Mouth/Throat:      Mouth: Mucous membranes are moist       Pharynx: No oropharyngeal exudate  Eyes:      General: Lids are normal       Conjunctiva/sclera: Conjunctivae normal       Pupils: Pupils are equal, round, and reactive to light  Neck:      Musculoskeletal: Normal range of motion and neck supple  Normal range of motion  No edema  Thyroid: No thyromegaly  Vascular: No JVD  Trachea: No tracheal deviation  Cardiovascular:      Rate and Rhythm: Normal rate and regular rhythm  Pulses: Normal pulses  Heart sounds: Normal heart sounds  No murmur  No friction rub  No gallop  Pulmonary:      Effort: Pulmonary effort is normal  No respiratory distress  Breath sounds: Normal breath sounds  No stridor  No wheezing or rales  Chest:      Chest wall: No tenderness  Abdominal:      General: Bowel sounds are normal  There is no distension  Palpations: Abdomen is soft  There is no mass  Tenderness: There is no abdominal tenderness  There is no guarding or rebound  Negative signs include Jimenez's sign  Hernia: No hernia is present  Musculoskeletal: Normal range of motion  General: No tenderness  Lymphadenopathy:      Cervical: No cervical adenopathy  Skin:     General: Skin is warm and dry  Capillary Refill: Capillary refill takes less than 2 seconds  Coloration: Skin is not pale  Findings: No erythema or rash  Neurological:      Mental Status: He is alert  GCS: GCS eye subscore is 4  GCS verbal subscore is 5  GCS motor subscore is 6  Sensory: No sensory deficit        Motor: Weakness and tremor present  No seizure activity or pronator drift  Gait: Gait abnormal       Deep Tendon Reflexes: Reflexes are normal and symmetric  Psychiatric:         Speech: Speech normal          Behavior: Behavior normal          Vital Signs  ED Triage Vitals   Temperature Pulse Respirations Blood Pressure SpO2   08/31/20 1901 08/31/20 1901 08/31/20 1901 08/31/20 1901 08/31/20 1901   98 6 °F (37 °C) 85 20 127/60 99 %      Temp Source Heart Rate Source Patient Position - Orthostatic VS BP Location FiO2 (%)   08/31/20 1901 08/31/20 1901 08/31/20 1901 08/31/20 1901 --   Temporal Monitor Sitting Left arm       Pain Score       08/31/20 2311       No Pain           Vitals:    08/31/20 2230 08/31/20 2245 08/31/20 2309 09/01/20 0708   BP: 169/81 164/72 146/78 142/74   Pulse: 96 89 69 58   Patient Position - Orthostatic VS: Sitting Sitting Sitting          Visual Acuity      ED Medications  Medications   allopurinol (ZYLOPRIM) tablet 300 mg (300 mg Oral Given 9/1/20 0831)   aspirin chewable tablet 81 mg (81 mg Oral Given 9/1/20 0830)   amLODIPine (NORVASC) tablet 10 mg (10 mg Oral Given 9/1/20 0831)   atorvastatin (LIPITOR) tablet 40 mg (40 mg Oral Given by Other 9/1/20 0004)   cyanocobalamin (VITAMIN B-12) tablet 1,000 mcg (1,000 mcg Oral Given 9/1/20 0831)   pantoprazole (PROTONIX) EC tablet 20 mg (20 mg Oral Given 9/1/20 0520)   tamsulosin (FLOMAX) capsule 0 4 mg (has no administration in time range)   galantamine (RAZADYNE) tablet 8 mg (has no administration in time range)   enoxaparin (LOVENOX) subcutaneous injection 40 mg (40 mg Subcutaneous Given 9/1/20 0830)       Diagnostic Studies  Results Reviewed     Procedure Component Value Units Date/Time    Blood culture #1 [[id number]] Collected:  08/31/20 1917    Lab Status:  Preliminary result Specimen:  Blood from Arm, Right Updated:  09/01/20 0901     Blood Culture Received in Microbiology Lab  Culture in Progress      Blood culture #2 [[id number]] Collected: 08/31/20 1917    Lab Status:  Preliminary result Specimen:  Blood from Arm, Left Updated:  09/01/20 0901     Blood Culture Received in Microbiology Lab  Culture in Progress  UA w Reflex to Microscopic w Reflex to Culture [[id number]] Collected:  08/31/20 2015    Lab Status:  Final result Specimen:  Urine, Clean Catch Updated:  08/31/20 2053     Color, UA Yellow     Clarity, UA Clear     Specific McDonald, UA 1 020     pH, UA 6 0     Leukocytes, UA Negative     Nitrite, UA Negative     Protein, UA Negative mg/dl      Glucose, UA Negative mg/dl      Ketones, UA Negative mg/dl      Urobilinogen, UA 0 2 E U /dl      Bilirubin, UA Negative     Blood, UA Negative    TSH [[id number]]  (Normal) Collected:  08/31/20 1917    Lab Status:  Final result Specimen:  Blood from Arm, Right Updated:  08/31/20 1947     TSH 3RD GENERATON 1 322 uIU/mL     Narrative:       Patients undergoing fluorescein dye angiography may retain small amounts of fluorescein in the body for 48-72 hours post procedure  Samples containing fluorescein can produce falsely depressed TSH values  If the patient had this procedure,a specimen should be resubmitted post fluorescein clearance  Magnesium [[id number]]  (Normal) Collected:  08/31/20 1917    Lab Status:  Final result Specimen:  Blood from Arm, Right Updated:  08/31/20 1947     Magnesium 2 0 mg/dL     Salicylate level [[id number]]  (Abnormal) Collected:  08/31/20 1917    Lab Status:  Final result Specimen:  Blood from Arm, Right Updated:  32/57/46 2000     Salicylate Lvl <3 mg/dL     Acetaminophen level-If concentration is detectable, please discuss with medical  on call   [[id number]]  (Abnormal) Collected:  08/31/20 1917    Lab Status:  Final result Specimen:  Blood from Arm, Right Updated:  08/31/20 1946     Acetaminophen Level <2 ug/mL     Lactic acid [[id number]]  (Normal) Collected:  08/31/20 1917    Lab Status:  Final result Specimen:  Blood from Arm, Right Updated:  08/31/20 1942 LACTIC ACID 1 7 mmol/L     Narrative:       Result may be elevated if tourniquet was used during collection      Troponin I [[id number]]  (Normal) Collected:  08/31/20 1917    Lab Status:  Final result Specimen:  Blood from Arm, Right Updated:  08/31/20 1942     Troponin I <0 02 ng/mL     Comprehensive metabolic panel [[id number]] Collected:  08/31/20 1917    Lab Status:  Final result Specimen:  Blood from Arm, Right Updated:  08/31/20 1940     Sodium 140 mmol/L      Potassium 3 8 mmol/L      Chloride 102 mmol/L      CO2 28 mmol/L      ANION GAP 10 mmol/L      BUN 14 mg/dL      Creatinine 0 95 mg/dL      Glucose 116 mg/dL      Calcium 9 1 mg/dL      AST 18 U/L      ALT 68 U/L      Alkaline Phosphatase 89 U/L      Total Protein 7 6 g/dL      Albumin 4 4 g/dL      Total Bilirubin 0 70 mg/dL      eGFR 77 ml/min/1 73sq m     Narrative:       [place] guidelines for Chronic Kidney Disease (CKD):     Stage 1 with normal or high GFR (GFR > 90 mL/min/1 73 square meters)    Stage 2 Mild CKD (GFR = 60-89 mL/min/1 73 square meters)    Stage 3A Moderate CKD (GFR = 45-59 mL/min/1 73 square meters)    Stage 3B Moderate CKD (GFR = 30-44 mL/min/1 73 square meters)    Stage 4 Severe CKD (GFR = 15-29 mL/min/1 73 square meters)    Stage 5 End Stage CKD (GFR <15 mL/min/1 73 square meters)  Note: GFR calculation is accurate only with a steady state creatinine    Ethanol [[id number]]  (Normal) Collected:  08/31/20 1917    Lab Status:  Final result Specimen:  Blood from Arm, Right Updated:  08/31/20 1939     Ethanol Lvl <3 mg/dL     Protime-INR [[id number]]  (Normal) Collected:  08/31/20 1917    Lab Status:  Final result Specimen:  Blood from Arm, Right Updated:  08/31/20 1934     Protime 14 2 seconds      INR 1 12    APTT [[id number]]  (Normal) Collected:  08/31/20 1917    Lab Status:  Final result Specimen:  Blood from Arm, Right Updated:  08/31/20 1934     PTT 27 seconds     CBC and differential [[id number]] Collected:  08/31/20 1917    Lab Status:  Final result Specimen:  Blood from Arm, Right Updated:  08/31/20 1923     WBC 8 06 Thousand/uL      RBC 4 74 Million/uL      Hemoglobin 15 0 g/dL      Hematocrit 43 4 %      MCV 92 fL      MCH 31 6 pg      MCHC 34 6 g/dL      RDW 12 7 %      MPV 9 8 fL      Platelets 152 Thousands/uL      nRBC 0 /100 WBCs      Neutrophils Relative 72 %      Immat GRANS % 0 %      Lymphocytes Relative 19 %      Monocytes Relative 8 %      Eosinophils Relative 1 %      Basophils Relative 0 %      Neutrophils Absolute 5 72 Thousands/µL      Immature Grans Absolute 0 03 Thousand/uL      Lymphocytes Absolute 1 52 Thousands/µL      Monocytes Absolute 0 68 Thousand/µL      Eosinophils Absolute 0 08 Thousand/µL      Basophils Absolute 0 03 Thousands/µL                  CT head without contrast   Final Result by David Davis MD (08/31 2000)      No acute intracranial abnormality  Workstation performed: [id number]         XR chest 1 view portable   ED Interpretation by Kaye Gerber PA-C (08/31 1934)   No evidence of acute cardiopulmonary process      Final Result by Basim Garvin MD (09/01 6713)      No acute cardiopulmonary disease              Workstation performed: [id number]                    Procedures  ECG 12 Lead Documentation Only    Date/Time: 8/31/2020 7:58 PM  Performed by: Kaye Gerber PA-C  Authorized by: Kaye Gerber PA-C     Indications / Diagnosis:  Altered mental status  ECG reviewed by me, the ED Provider: yes    Patient location:  Bedside  Previous ECG:     Comparison to cardiac monitor: Yes    Interpretation:     Interpretation: normal    Quality:     Tracing quality:  Limited by artifact  Rate:     ECG rate:  70    ECG rate assessment: normal    Rhythm:     Rhythm: sinus rhythm    Ectopy:     Ectopy: none    QRS:     QRS axis:  Normal    QRS intervals:  Normal  Conduction:     Conduction: normal    ST segments:     ST segments:  Normal  T waves:     T waves: normal               ED Course  ED Course as of Sep 01 0906   [facility] Aug 31, 2020   1918 Blood Pressure: 127/60   1918 Temperature: 98 6 °F (37 °C)   1918 Pulse: 85   1918 Respirations: 20   1918 SpO2: 99 %   1923 WBC: 8 06   1923 Hemoglobin: 15 0   1923 Platelet Count: 912   6405 PTT: 27   1941 MEDICAL ALCOHOL: <3   1941 Alkaline Phosphatase: 89   1941 Albumin: 4 4   1941 eGFR: 77   1948 Magnesium: 2 0   1948 TSH 3RD GENERATON: 1 322   1958 Awaiting CT read      2003 IMPRESSION:     No acute intracranial abnormality          2004 ACETAMINOPHEN LEVEL(!): <2   2004 MEDICAL ALCOHOL: <3   2004 Troponin I: <0 02   2004 TSH 3RD GENERATON: 1 322   2004 Magnesium: 2 0   8598 Certainly based upon physical examination findings the patient does have legitimate ambulatory dysfunction  He does appear fairly rigid in general from neuromuscular standpoint  I do feel as though he is unable to take care of himself in the private home setting  Awaiting urine  Reasonable to admit this patient for ambulatory dysfunction, have PT OT evaluate this patient and likely need short-term rehabilitation  Patient's daughter is in agreement with this plan  2053 Blood, UA: Negative   2053 SL AMB POCT UROBILINOGEN: 0 2   2053 Ketones, UA: Negative   2053 Leukocytes, UA: Negative   2053 SL AMB SPECIFIC GRAVITY_URINE: 1 020   2053 Color, UA: Yellow   2118 I did give verbal report to Dr Marcus Chapman regarding this patient, Pt is stable but will require admission here for further treatment  US AUDIT      Most Recent Value   Initial Alcohol Screen: US AUDIT-C    1  How often do you have a drink containing alcohol?  0 Filed at: 08/31/2020 1916   2  How many drinks containing alcohol do you have on a typical day you are drinking? 0 Filed at: 08/31/2020 1916   3a  Male UNDER 65: How often do you have five or more drinks on one occasion? 0 Filed at: 08/31/2020 1916   3b  FEMALE Any Age, or MALE 65+:  How often do you have 4 or more drinks on one occassion? 0 Filed at: 08/31/2020 1916   Audit-C Score  0 Filed at: 08/31/2020 1916            HEART Risk Score      Most Recent Value   Heart Score Risk Calculator   History  0 Filed at: 08/31/2020 1959   ECG  0 Filed at: 08/31/2020 1959   Age  2 Filed at: 08/31/2020 1959   Risk Factors  2 Filed at: 08/31/2020 1959   Troponin  0 Filed at: 08/31/2020 1959   HEART Score  4 Filed at: 08/31/2020 1959            MELVA/DAST-10      Most Recent Value   How many times in the past year have you    Used an illegal drug or used a prescription medication for non-medical reasons? Never Filed at: 08/31/2020 1902                                MDM      Disposition  Final diagnoses:   Ambulatory dysfunction   Altered mental status     Time reflects when diagnosis was documented in both MDM as applicable and the Disposition within this note     Time User Action Codes Description Comment    8/31/2020  8:54 PM Meek Rose Add [R26 2] Ambulatory dysfunction     8/31/2020  8:55 PM Meek Rose Add [R41 82] Altered mental status       ED Disposition     ED Disposition Condition Date/Time Comment    Admit Stable Mon Aug 31, 2020 10:27 PM Case was discussed with HEIDI Whittaker, for [facility]  who is coming to see the patient now          Follow-up Information    None         Current Discharge Medication List      CONTINUE these medications which have NOT CHANGED    Details   allopurinol (ZYLOPRIM) 300 mg tablet Take 300 mg by mouth daily      amLODIPine (NORVASC) 10 mg tablet Take 10 mg by mouth daily      aspirin 81 mg chewable tablet Chew 1 tablet (81 mg total) daily  Qty: 30 tablet, Refills: 0    Associated Diagnoses: Carotid stenosis, left      atorvastatin (LIPITOR) 40 mg tablet Take 1 tablet (40 mg total) by mouth every evening  Qty: 30 tablet, Refills: 0    Associated Diagnoses: Carotid stenosis, left      cyanocobalamin (VITAMIN B-12) 1000 MCG tablet Take 1,000 mcg by mouth daily      galantamine (RAZADYNE) 8 mg tablet Take 8 mg by mouth 2 (two) times a day      omeprazole (PriLOSEC) 20 mg delayed release capsule Take 20 mg by mouth daily      tamsulosin (FLOMAX) 0 4 mg Take 1 capsule (0 4 mg total) by mouth daily with dinner  Qty: 30 capsule, Refills: 0    Associated Diagnoses: Urinary retention           No discharge procedures on file      PDMP Review     None          ED Provider  Electronically Signed by           Nataly Cornelius PA-C  09/01/20 0091

## 2020-09-01 ENCOUNTER — TELEPHONE (OUTPATIENT)
Dept: INTERNAL MEDICINE CLINIC | Facility: CLINIC | Age: 75
End: 2020-09-01

## 2020-09-01 ENCOUNTER — TELEPHONE (OUTPATIENT)
Dept: NEUROLOGY | Facility: CLINIC | Age: 75
End: 2020-09-01

## 2020-09-01 ENCOUNTER — TRANSITIONAL CARE MANAGEMENT (OUTPATIENT)
Dept: INTERNAL MEDICINE CLINIC | Facility: CLINIC | Age: 75
End: 2020-09-01

## 2020-09-01 VITALS
DIASTOLIC BLOOD PRESSURE: 90 MMHG | BODY MASS INDEX: 24.84 KG/M2 | OXYGEN SATURATION: 94 % | HEIGHT: 72 IN | SYSTOLIC BLOOD PRESSURE: 144 MMHG | HEART RATE: 81 BPM | TEMPERATURE: 98.1 F | RESPIRATION RATE: 20 BRPM | WEIGHT: 183.42 LBS

## 2020-09-01 PROBLEM — F03.90 DEMENTIA (HCC): Status: ACTIVE | Noted: 2020-09-01

## 2020-09-01 LAB
ATRIAL RATE: 70 BPM
P AXIS: 64 DEGREES
PR INTERVAL: 150 MS
QRS AXIS: -19 DEGREES
QRSD INTERVAL: 92 MS
QT INTERVAL: 400 MS
QTC INTERVAL: 432 MS
T WAVE AXIS: 58 DEGREES
VENTRICULAR RATE: 70 BPM

## 2020-09-01 PROCEDURE — 99239 HOSP IP/OBS DSCHRG MGMT >30: CPT | Performed by: INTERNAL MEDICINE

## 2020-09-01 PROCEDURE — 93010 ELECTROCARDIOGRAM REPORT: CPT | Performed by: INTERNAL MEDICINE

## 2020-09-01 RX ORDER — GALANTAMINE HYDROBROMIDE 8 MG/1
8 TABLET, FILM COATED ORAL 2 TIMES DAILY
Status: DISCONTINUED | OUTPATIENT
Start: 2020-09-01 | End: 2020-09-01 | Stop reason: HOSPADM

## 2020-09-01 RX ADMIN — GALANTAMINE 8 MG: 8 TABLET ORAL at 17:35

## 2020-09-01 RX ADMIN — ASPIRIN 81 MG 81 MG: 81 TABLET ORAL at 08:30

## 2020-09-01 RX ADMIN — ALLOPURINOL 300 MG: 300 TABLET ORAL at 08:31

## 2020-09-01 RX ADMIN — ENOXAPARIN SODIUM 40 MG: 40 INJECTION SUBCUTANEOUS at 08:30

## 2020-09-01 RX ADMIN — DESMOPRESSIN ACETATE 40 MG: 0.2 TABLET ORAL at 17:34

## 2020-09-01 RX ADMIN — DESMOPRESSIN ACETATE 40 MG: 0.2 TABLET ORAL at 00:04

## 2020-09-01 RX ADMIN — TAMSULOSIN HYDROCHLORIDE 0.4 MG: 0.4 CAPSULE ORAL at 17:34

## 2020-09-01 RX ADMIN — AMLODIPINE BESYLATE 10 MG: 10 TABLET ORAL at 08:31

## 2020-09-01 RX ADMIN — CYANOCOBALAMIN TAB 1000 MCG 1000 MCG: 1000 TAB at 08:31

## 2020-09-01 RX ADMIN — PANTOPRAZOLE SODIUM 20 MG: 20 TABLET, DELAYED RELEASE ORAL at 05:20

## 2020-09-01 NOTE — NURSING NOTE
AVS printed and gone over with patients daughter  Pt daughter had no questions at discharge  Pt left floor via wheelchair accompanied by PCA in stable condition

## 2020-09-01 NOTE — TELEPHONE ENCOUNTER
Daughter Erwin Calderon called - Not on CC      Med Surg / Dementia, Amb dys/ Medicare A&B and VA  Has 12/30 11a Jess Bernaben  ANY SOONER - mostly for Memory and Med Check

## 2020-09-01 NOTE — TELEPHONE ENCOUNTER
----- Message from [place] Nurse sent at 9/1/2020  2:01 PM EDT -----  Regarding: FW: set up with you as pt's PCP provider    ----- Message -----  From: Priyank Sebastian  Sent: 9/1/2020   1:58 PM EDT  To: [address] Clinical  Subject: set up with you as pt's PCP provider             Pt is being dc'd home today, usually went to the [place] but wants to switch to [facility] as primary care  Will use his Medicare  Please call daughter William Enrique to schedule an appt  Phone is [phone]  Pt going to [place] [id number] in [facility] on [address]  (his other daughters house)

## 2020-09-01 NOTE — ED ATTENDING ATTESTATION
8/31/2020  I, Darcy Deshpande MD, have discussed the patient with the resident/non-physician practitioner and agree with the resident's/non-physician practitioner's Plan of Care and MDM as documented in the resident's/non-physician practitioner's note, except where noted  All available labs and Radiology studies were reviewed  I was immediately available to provide assistance  At this point I agree with the current assessment done in the Emergency Department  ED Course  ED Course as of Sep 01 0634   Mon Aug 31, 2020   2262 [facility] aware via text      [address], for [facility]  called back to discuss pt and is coming to see him now              Critical Care Time  Procedures

## 2020-09-01 NOTE — ED NOTES
Daughter at [facility] [address] [facility]  08/31/20 0666

## 2020-09-01 NOTE — ASSESSMENT & PLAN NOTE
Patient appears to have underlying dementia, with unknown etiology as to the cause of his memory loss  There is report of associated rigidity and tremors, raising the possibility of potential Lewy body dementia  Please note that the patient also has reported fluctuating cognition and recurrent visual hallucinations as per discussion with daughter, making this diagnosis a possibility  He was hospitalized here in December of 2019 with a similar story - an outpatient MRI neuroquant was ordered, unclear if this was performed  In discussion with the patient's daughter it is unclear what the workup has been from a [facility], at times to obtain records have been difficult as he is a [place] patient  Following discussion with the patient's daughter, plan will be to discharge him home with home health for nursing and PT/OT, which he has benefited from greatly in the past   He will also be obtaining a local PCP and neurologist to further assist with the diagnosis of his underlying process - case management assisting with obtaining local care  Patient also with normal lab work including CMP and CBC, negative troponin, normal lactic acid, normal urinalysis, and normal TSH  No evidence of infection, and the patient is afebrile  ETOH level, acetaminophen, salicylate levels all normal   CXR CT of the head also performed negative  No etiology discovered for patient's neurologic or gait dysfunction

## 2020-09-01 NOTE — ASSESSMENT & PLAN NOTE
Patient appears to be essentially wheelchair-bound at baseline, with minimal ambulatory activities that are assisted as per discussion with daughter  Also with significant rigidity as per verbal report from family, with significant deterioration in symptoms recently without physical therapy  The patient's daughter reports that he has done very well with outpatient physical therapy in the past, and he will be scheduled for home health PT/OT  Also for further workup as above with neurology and PCP involvement

## 2020-09-01 NOTE — UTILIZATION REVIEW
Initial Clinical Review    Admission: Date/Time/Statement:   Admission Orders (From admission, onward)     Ordered        08/31/20 2246  Inpatient Admission  Once                   Orders Placed This Encounter   Procedures    Inpatient Admission     Standing Status:   Standing     Number of Occurrences:   1     Order Specific Question:   Admitting Physician     Answer:   Carlton Capellan     Order Specific Question:   Level of Care     Answer:   Med Surg [16]     Order Specific Question:   Estimated length of stay     Answer:   More than 2 Midnights     Order Specific Question:   Certification     Answer:   I certify that inpatient services are medically necessary for this patient for a duration of greater than two midnights  See H&P and MD Progress Notes for additional information about the patient's course of treatment  ED Arrival Information     Expected Arrival Acuity Means of Arrival Escorted By Service Admission Type    - 8/31/2020 18:46 Urgent Wheelchair Family Member General Medicine Urgent    Arrival Complaint    Altered Mental Status        Chief Complaint   Patient presents with    Altered Mental Status     Patient has been having altered mental status and becoming more rigid per daughter      Assessment/Plan:   67 yom to er with family  Daughters state pt having visual hallucinations, increased generalized muscle rigidity, confused, dark colored urine with enuresis & frequency x 2 days  Hx memory loss-dementia, gerd , htn  Presents currently A&O x3, with above s/s  Admitted to inpatient status for ambulatory dysfunction 2nd altered mental status      ED Triage Vitals   Temperature Pulse Respirations Blood Pressure SpO2   08/31/20 1901 08/31/20 1901 08/31/20 1901 08/31/20 1901 08/31/20 1901   98 6 °F (37 °C) 85 20 127/60 99 %      Temp Source Heart Rate Source Patient Position - Orthostatic VS BP Location FiO2 (%)   08/31/20 1901 08/31/20 1901 08/31/20 1901 08/31/20 1901 --   Temporal Monitor Sitting Left arm       Pain Score       08/31/20 2311       No Pain          Wt Readings from Last 1 Encounters:   09/01/20 83 2 kg (183 lb 6 8 oz)     Additional Vital Signs:   08/31/20 2130      69   18   163/74   107   95 %   None (Room air)   Sitting    08/31/20 2030      69   18   159/70   101   96 %   None (Room air)   Sitting    08/31/20 2000      70   18   152/68   98   97 %   None (Room air)   Sitting    08/31/20 1945      68   18   149/65   93   98 %   None (Room air)   Sitting    08/31/20 1932                     None (Room air)       08/31/20 1901   98 6 °F (37 °C)   85   20   127/60      99 %   None (Room air)   Sitting      Pertinent Labs/Diagnostic Test Results:   Results from last 7 days   Lab Units 08/31/20 1917   WBC Thousand/uL 8 06   HEMOGLOBIN g/dL 15 0   HEMATOCRIT % 43 4   PLATELETS Thousands/uL 171   NEUTROS ABS Thousands/µL 5 72     Results from last 7 days   Lab Units 08/31/20 1917   SODIUM mmol/L 140   POTASSIUM mmol/L 3 8   CHLORIDE mmol/L 102   CO2 mmol/L 28   ANION GAP mmol/L 10   BUN mg/dL 14   CREATININE mg/dL 0 95   EGFR ml/min/1 73sq m 77   CALCIUM mg/dL 9 1   MAGNESIUM mg/dL 2 0     Results from last 7 days   Lab Units 08/31/20 1917   AST U/L 18   ALT U/L 68   ALK PHOS U/L 89   TOTAL PROTEIN g/dL 7 6   ALBUMIN g/dL 4 4   TOTAL BILIRUBIN mg/dL 0 70     Results from last 7 days   Lab Units 08/31/20 1917   GLUCOSE RANDOM mg/dL 116     Results from last 7 days   Lab Units 08/31/20 1917   TROPONIN I ng/mL <0 02     Results from last 7 days   Lab Units 08/31/20 1917   PROTIME seconds 14 2   INR  1 12   PTT seconds 27     Results from last 7 days   Lab Units 08/31/20 1917   TSH 3RD GENERATON uIU/mL 1 322     Results from last 7 days   Lab Units 08/31/20 1917   LACTIC ACID mmol/L 1 7     Results from last 7 days   Lab Units 08/31/20 2015   CLARITY UA  Clear   COLOR UA  Yellow   SPEC GRAV UA  1 020   PH UA  6 0   GLUCOSE UA mg/dl Negative   KETONES UA mg/dl Negative   BLOOD UA  Negative   PROTEIN UA mg/dl Negative   NITRITE UA  Negative   BILIRUBIN UA  Negative   UROBILINOGEN UA E U /dl 0 2   LEUKOCYTES UA  Negative     Results from last 7 days   Lab Units 08/31/20 1917   ETHANOL LVL mg/dL <3   ACETAMINOPHEN LVL ug/mL <2*   SALICYLATE LVL mg/dL <3*     Results from last 7 days   Lab Units 08/31/20 1917   BLOOD CULTURE  Received in Microbiology Lab  Culture in Progress  Received in Microbiology Lab  Culture in Progress  8/31  Cxr= No acute cardiopulmonary disease  Ct head=No acute intracranial abnormality  Ekg=Rhythm: sinus rhythm     Ectopy:     Ectopy: none     QRS:     QRS axis:  Normal     QRS intervals:  Normal   Conduction:     Conduction: normal     ST segments:     ST segments:  Normal   T waves:     T waves: normal       Past Medical History:   Diagnosis Date    GERD (gastroesophageal reflux disease)     Hypertension      Present on Admission:   Ambulatory dysfunction   Essential hypertension   Gastroesophageal reflux disease without esophagitis   Dementia (HCC)    Admitting Diagnosis: Altered mental status [R41 82]  Ambulatory dysfunction [R26 2]  Age/Sex: 67 y o  male  Admission Orders:  Cont pulse ox  Pt/ot eval & tx  scd    Scheduled Medications:  allopurinol, 300 mg, Oral, Daily  amLODIPine, 10 mg, Oral, Daily  aspirin, 81 mg, Oral, Daily  atorvastatin, 40 mg, Oral, QPM  cyanocobalamin, 1,000 mcg, Oral, Daily  enoxaparin, 40 mg, Subcutaneous, Daily  galantamine, 8 mg, Oral, BID  pantoprazole, 20 mg, Oral, Early Morning  tamsulosin, 0 4 mg, Oral, Daily With [place] [facility] Financial Utilization Review Department  [email or web address]  ATTENTION: Please call with any questions or concerns to [phone] and carefully listen to the prompts so that you are directed to the right person   All voicemails are confidential   [facility] all requests for admission clinical reviews, approved or denied determinations and any other requests to dedicated fax number below belonging to the campus where the patient is receiving treatment   List of dedicated fax numbers for the Facilities:  [address] DENIALS (Administrative/Medical Necessity) [phone]   [address]  (Maternity/NICU/Pediatrics) [phone]   Rowdy Garcia [phone]   Barb Tyshawn [phone]   Nolan Cintron [phone]   Gerri Nyhan [phone]   [address] [place] [address] [phone]   [facility]  [phone]   [address] [facility]  [address] [address] [address] [facility] [phone]

## 2020-09-01 NOTE — ASSESSMENT & PLAN NOTE
Chart review indicates patient has a history of memory loss-dementia  Chart review also indicates that recent hospitalization with a discharge for an MRI with neuroquant  Chief complaint from daughter is patient is confused more than normal hallucinating seeing people who were not there-states that this is what it was like last admission  Daughter also concerned patient is more rigid and requiring more assistance with ambulation and movement  Continue prior to admission galantamine -not on formulary patient's daughter states that she would bring in from home  Provide supportive care  Patient is alert and oriented x3 at present time  Consider neurology consult

## 2020-09-01 NOTE — ASSESSMENT & PLAN NOTE
Patient's daughter states the patient utilizes wheelchair also states patient needs maximum assist with ambulating in transfers    PT and OT has been consulted  Care management consult  Ambulate with assist at all times

## 2020-09-01 NOTE — DISCHARGE SUMMARY
Discharge- Laura Osuna 1945, 67 y o  male MRN: [id number]    Unit/Bed#: 423-01 Encounter: [id number]    Primary Care Provider: Eugenia Ramirez MD   Date and time admitted to hospital: 8/31/2020  6:55 PM        * Dementia [facility])  Assessment & Plan  Patient appears to have underlying dementia, with unknown etiology as to the cause of his memory loss  There is report of associated rigidity and tremors, raising the possibility of potential Lewy body dementia  Please note that the patient also has reported fluctuating cognition and recurrent visual hallucinations as per discussion with daughter, making this diagnosis a possibility  He was hospitalized here in December of 2019 with a similar story - an outpatient MRI neuroquant was ordered, unclear if this was performed  In discussion with the patient's daughter it is unclear what the workup has been from a straight Liz Cottrell, at times to obtain records have been difficult as he is a [place] patient  Following discussion with the patient's daughter, plan will be to discharge him home with home health for nursing and PT/OT, which he has benefited from greatly in the past   He will also be obtaining a local PCP and neurologist to further assist with the diagnosis of his underlying process - case management assisting with obtaining local care  Patient also with normal lab work including CMP and CBC, negative troponin, normal lactic acid, normal urinalysis, and normal TSH  No evidence of infection, and the patient is afebrile  ETOH level, acetaminophen, salicylate levels all normal   CXR CT of the head also performed negative  No etiology discovered for patient's neurologic or gait dysfunction  Ambulatory dysfunction  Assessment & Plan  Patient appears to be essentially wheelchair-bound at baseline, with minimal ambulatory activities that are assisted as per discussion with daughter    Also with significant rigidity as per verbal report from family, with significant deterioration in symptoms recently without physical therapy  The patient's daughter reports that he has done very well with outpatient physical therapy in the past, and he will be scheduled for home health PT/OT  Also for further workup as above with neurology and PCP involvement  Discharging Physician / Practitioner: Geena Starr MD  PCP: Lisa Drake MD  Admission Date:   Admission Orders (From admission, onward)     Ordered        08/31/20 2246  Inpatient Admission  Once                   Discharge Date: 09/01/20    Resolved Problems  Date Reviewed: 9/1/2020    None          Consultations During Hospital Stay:  · None    Procedures Performed:   · None    Significant Findings / Test Results:   Xr Chest 1 View Portable    Result Date: 9/1/2020  Impression: No acute cardiopulmonary disease  Workstation performed: [id number]     Ct Head Without Contrast    Result Date: 8/31/2020  Impression: No acute intracranial abnormality  Workstation performed: [id number]     Incidental Findings:   · None     Test Results Pending at Discharge (will require follow up): · None     Outpatient Tests Requested:  · None    Complications:  None    Reason for Admission:  Altered mental status, ambulatory dysfunction  Hospital Course:     65-year-old man with past medical history significant for underlying dementia and ambulatory dysfunction, admitted from [facility] ED on 08/31 ED with a diagnosis of ambulatory dysfunction as well as worsening signs of dementia  Mr Ann Plascencia has past medical history significant for HTN, GERD, dementia, and gout  He was brought to the ED with his daughter reporting concern regarding worsening confusion at home, consisting of hallucinations and worsening memory disturbances  Also with noted rigidity that has been increasing as of recent past, necessitating home wheelchair use    According to the daughter the patient is ambulatory dysfunction had been improving with physical therapy at home, but worsened off of it in the recent past   Patient was admitted in December of 2019, with a similar description of symptoms including worsening mental status  At that time an MRI neuroquant was ordered to be performed as an outpatient, but no evidence of this test having been done either locally or within the region  However discussion with the patient's daughter it appears that he follows exclusively at the [place], and has seen neurology there as well  She reports that underlying etiology for symptoms her as of yet unclear  Patient's workup included a normal CMP, CBC, lactic acid, troponin, coags, along with a negative urinalysis  His TSH was checked and normal   Acetaminophen and salicylate levels were checked and undetectable, as were ETOH levels  Imaging with CXR and CT of the head were negative as well  Review of records indicates an MRI having been performed during his last hospitalization in December, essentially negative for any acute findings  Upon review of the patient's history, negative lab work, and imaging, along with his reported recurrent visual hallucinations, worsening and fluctuating cognition, and reported rigidity question possibility of Lewy body dementia  Following discussion with family as well as Case Management, decision was made to make follow-up appointment with a local PCP and neurology for further evaluation and treatment  He will also be scheduled for home health PT/OT as well as nursing  Please see above list of diagnoses and related plan for additional information  Condition at Discharge: stable     Discharge Day Visit / Exam:     * Please refer to separate progress note for these details *    Discussion with Family: Yes    Discharge instructions/Information to patient and family:   See after visit summary for information provided to patient and family        Provisions for Follow-Up Care:  See after visit summary for information related to follow-up care and any pertinent home health orders  Disposition:     Home with VNA Services (Reminder: Complete face to face encounter)    For Discharges to [facility] SNF:   · Not Applicable to this Patient - Not Applicable to this Patient    Planned Readmission: No     Discharge Statement:  I spent 40 minutes discharging the patient  This time was spent on the day of discharge  I had direct contact with the patient on the day of discharge  Greater than 50% of the total time was spent examining patient, answering all patient questions, arranging and discussing plan of care with patient as well as directly providing post-discharge instructions  Additional time then spent on discharge activities  Discharge Medications:  See after visit summary for reconciled discharge medications provided to patient and family        ** Please Note: This note has been constructed using a voice recognition system **

## 2020-09-01 NOTE — SOCIAL WORK
Pt to be dc'd home today and daughter Mariel Robles will be over around 4:30pm to pick pt up  Pt going to stay at his other daughters house(Chasity Munson) at [place] [address] in [facility]  VNA (RN, PT and OT) being arranged as well as setting pt up with a [facility] PCP as well as [facility] as family's request   Cm in [facility] as well as [facility] to schedule pt and call daughter Mariel Robles with appointments  A post acute care recommendation was made by your care team for [facility]  Discussed Freedom of Choice with daughter Mariel Robles  List of agencies given to daughter over the phone  Daughter aware the list is custom filtered for them by preference  and that [facility] post acute providers are designated  Referral to [facility]

## 2020-09-01 NOTE — H&P
H&P- Emperatriz Zamorano 1945, 67 y o  male MRN: [id number]    Unit/Bed#: OJ23 Encounter: [id number]    Primary Care Provider: Anne Marie Mathew MD   Date and time admitted to hospital: 8/31/2020  6:55 PM        * Ambulatory dysfunction  Assessment & Plan  Patient's daughter states the patient utilizes wheelchair also states patient needs maximum assist with ambulating in transfers  PT and OT has been consulted  Care management consult  Ambulate with assist at all times    Dementia [facility])  Assessment & Plan  Chart review indicates patient has a history of memory loss-dementia  Chart review also indicates that recent hospitalization with a discharge for an MRI with neuroquant  Chief complaint from daughter is patient is confused more than normal hallucinating seeing people who were not there-states that this is what it was like last admission  Daughter also concerned patient is more rigid and requiring more assistance with ambulation and movement  Continue prior to admission galantamine -not on formulary patient's daughter states that she would bring in from home  Provide supportive care  Patient is alert and oriented x3 at present time  Consider neurology consult    Essential hypertension  Assessment & Plan  Blood pressure currently stable  Admit to med surge  Monitor per protocol  Continue prior to admission medication    Gastroesophageal reflux disease without esophagitis  Assessment & Plan  Continue prior to admission PPI      VTE Prophylaxis: Enoxaparin (Lovenox)  / sequential compression device   Code Status: DNR/DNI  POLST: POLST is not applicable to this patient    Anticipated Length of Stay:  Patient will be admitted on an Inpatient basis with an anticipated length of stay of  Greater than 2 midnights  Justification for Hospital Stay:  Ambulatory dysfunction     Total Time for Visit, including Counseling / Coordination of Care: 45 minutes    Greater than 50% of this total time spent on direct patient counseling and coordination of care  Chief Complaint:   Patient's daughter expresses feels that patient is confused and is hallucinating-seeing people who were not there additionally, expresses concerns of increased muscle rigidity and increased in assistance required for ambulation and transfers    History of Present Illness:    Jairo Dolan is a 67 y o  male past medical history of hypertension GERD dementia presented to the emergency room accompanied by his daughter Jono Ragsdale expresses concerns the patient is more confused-hallucinating seeing people who were not there; additionally reports increased muscle rigidity and requiring more assistance for ambulating and transfers  Chart review indicates similar admission in January 2020 and patient was discharged to [facility] nursing [facility] for PT and OT additionally was ordered outpatient Alessia Zarate  Jono Ragsdale states that she does not believe that this imaging was ever collected  Additionally since last admission patient was placed on galantamine 8 mg b i d  Patient lives at home with his wife and daughter  Images and labs were collected emergency room-see below no significant findings noted this time  Patient is weak and is on and revealed to ambulate needs a minimum of 3 right now  Patient is alert and oriented x3 at present  Will admit to med surge PT and OT will be consulted  Review of Systems:    Review of Systems   Musculoskeletal: Positive for gait problem ( chronic and increasing)  Neurological: Positive for weakness (Chronic)  All other systems reviewed and are negative  Past Medical and Surgical History:     Past Medical History:   Diagnosis Date    GERD (gastroesophageal reflux disease)     Hypertension        History reviewed  No pertinent surgical history  Meds/Allergies:    Prior to Admission medications    Medication Sig Start Date End Date Taking?  Authorizing Provider   galantamine (RAZADYNE) 8 mg tablet Take 8 mg by mouth 2 (two) times a day   Yes Historical Provider, MD   allopurinol (ZYLOPRIM) 300 mg tablet Take 300 mg by mouth daily    Historical Provider, MD   amLODIPine (NORVASC) 10 mg tablet Take 10 mg by mouth daily    Historical Provider, MD   aspirin 81 mg chewable tablet Chew 1 tablet (81 mg total) daily 12/28/19   Narda Gann PA-C   atorvastatin (LIPITOR) 40 mg tablet Take 1 tablet (40 mg total) by mouth every evening 12/27/19   Narda Gann PA-C   cyanocobalamin (VITAMIN B-12) 1000 MCG tablet Take 1,000 mcg by mouth daily    Historical Provider, MD   omeprazole (PriLOSEC) 20 mg delayed release capsule Take 20 mg by mouth daily    Historical Provider, MD   tamsulosin (FLOMAX) 0 4 mg Take 1 capsule (0 4 mg total) by mouth daily with dinner 12/27/19   Narda Gann PA-C     I have reviewed home medications using allscripts  Allergies: Allergies   Allergen Reactions    Penicillins        Social History:     Marital Status: /Civil Union   Occupation: retired  Patient Pre-hospital Living Situation: w/ spouse  Patient Pre-hospital Level of Mobility: very limited  Patient Pre-hospital Diet Restrictions: denies  Substance Use History:   Social History     Substance and Sexual Activity   Alcohol Use Never    Frequency: Never     Social History     Tobacco Use   Smoking Status Former Smoker   Smokeless Tobacco Never Used     Social History     Substance and Sexual Activity   Drug Use Never       Family History:    History reviewed  No pertinent family history  Physical Exam:     Vitals:   Blood Pressure: 164/72 (08/31/20 2245)  Pulse: 89 (08/31/20 2245)  Temperature: 98 6 °F (37 °C) (08/31/20 1901)  Temp Source: Temporal (08/31/20 1901)  Respirations: 16 (08/31/20 2245)  Weight - Scale: 85 1 kg (187 lb 9 8 oz) (08/31/20 1901)  SpO2: 97 % (08/31/20 2245)    Physical Exam  Constitutional:       Appearance: Normal appearance  HENT:      Head: Normocephalic     Eyes:      Extraocular Movements: Extraocular movements intact  Pupils: Pupils are equal, round, and reactive to light  Cardiovascular:      Rate and Rhythm: Normal rate and regular rhythm  Pulmonary:      Effort: Pulmonary effort is normal       Breath sounds: Normal breath sounds  Abdominal:      General: Abdomen is flat  Bowel sounds are normal       Palpations: Abdomen is soft  Musculoskeletal:         General: No swelling or tenderness  Skin:     General: Skin is warm and dry  Capillary Refill: Capillary refill takes less than 2 seconds  Neurological:      General: No focal deficit present  Mental Status: He is alert and oriented to person, place, and time  Psychiatric:         Mood and Affect: Mood normal          Behavior: Behavior normal          Thought Content: Thought content normal          Judgment: Judgment normal          Additional Data:     Lab Results: I have personally reviewed pertinent reports  Results from last 7 days   Lab Units 08/31/20  1917   WBC Thousand/uL 8 06   HEMOGLOBIN g/dL 15 0   HEMATOCRIT % 43 4   PLATELETS Thousands/uL 171   NEUTROS PCT % 72   LYMPHS PCT % 19   MONOS PCT % 8   EOS PCT % 1     Results from last 7 days   Lab Units 08/31/20  1917   POTASSIUM mmol/L 3 8   CHLORIDE mmol/L 102   CO2 mmol/L 28   BUN mg/dL 14   CREATININE mg/dL 0 95   CALCIUM mg/dL 9 1   ALK PHOS U/L 89   ALT U/L 68   AST U/L 18     Results from last 7 days   Lab Units 08/31/20  1917   INR  1 12       Imaging: I have personally reviewed pertinent reports  Ct Head Without Contrast    Result Date: 8/31/2020  Narrative: CT BRAIN - WITHOUT CONTRAST INDICATION:   Altered mental status  COMPARISON:  MRI dated 12/23/2019 TECHNIQUE:  CT examination of the brain was performed  In addition to axial images, sagittal and coronal 2D reformatted images were created and submitted for interpretation  Radiation dose length product (DLP) for this visit:  467 42 mGy-cm     This examination, like all CT scans performed in the [facility], was performed utilizing techniques to minimize radiation dose exposure, including the use of iterative  reconstruction and automated exposure control  IMAGE QUALITY:  Diagnostic  FINDINGS: PARENCHYMA:  No intracranial mass, mass effect or midline shift  No CT signs of acute infarction  No acute parenchymal hemorrhage  VENTRICLES AND EXTRA-AXIAL SPACES:  Normal for the patient's age  VISUALIZED ORBITS AND PARANASAL SINUSES:  Chronic right maxillary sinus disease noted  CALVARIUM AND EXTRACRANIAL SOFT TISSUES:  Normal      Impression: No acute intracranial abnormality  Workstation performed: [id number]       Epic / [facility] Everywhere Records Reviewed: Yes     ** Please Note: This note has been constructed using a voice recognition system   **

## 2020-09-01 NOTE — PLAN OF CARE
Problem: Potential for Falls  Goal: Patient will remain free of falls  Description: INTERVENTIONS:  - Assess patient frequently for physical needs  -  Identify cognitive and physical deficits and behaviors that affect risk of falls    -  [place] fall precautions as indicated by assessment   - Educate patient/family on patient safety including physical limitations  - Instruct patient to call for assistance with activity based on assessment  - Modify environment to reduce risk of injury  - Consider OT/PT consult to assist with strengthening/mobility  Outcome: Progressing     Problem: Prexisting or High Potential for Compromised Skin Integrity  Goal: Skin integrity is maintained or improved  Description: INTERVENTIONS:  - Identify patients at risk for skin breakdown  - Assess and monitor skin integrity  - Assess and monitor nutrition and hydration status  - Monitor labs   - Assess for incontinence   - Turn and reposition patient  - Assist with mobility/ambulation  - Relieve pressure over bony prominences  - Avoid friction and shearing  - Provide appropriate hygiene as needed including keeping skin clean and dry  - Evaluate need for skin moisturizer/barrier cream  - Collaborate with interdisciplinary team   - Patient/family teaching  - Consider wound care consult   Outcome: Progressing     Problem: PAIN - ADULT  Goal: Verbalizes/displays adequate comfort level or baseline comfort level  Description: Interventions:  - Encourage patient to monitor pain and request assistance  - Assess pain using appropriate pain scale  - Administer analgesics based on type and severity of pain and evaluate response  - Implement non-pharmacological measures as appropriate and evaluate response  - Consider cultural and social influences on pain and pain management  - Notify physician/advanced practitioner if interventions unsuccessful or patient reports new pain  Outcome: Progressing     Problem: INFECTION - ADULT  Goal: Absence or prevention of progression during hospitalization  Description: INTERVENTIONS:  - Assess and monitor for signs and symptoms of infection  - Monitor lab/diagnostic results  - Monitor all insertion sites, i e  indwelling lines, tubes, and drains  - Monitor endotracheal if appropriate and nasal secretions for changes in amount and color  - [place] appropriate cooling/warming therapies per order  - Administer medications as ordered  - Instruct and encourage patient and family to use good hand hygiene technique  - Identify and instruct in appropriate isolation precautions for identified infection/condition  Outcome: Progressing  Goal: Absence of fever/infection during neutropenic period  Description: INTERVENTIONS:  - Monitor WBC    Outcome: Progressing     Problem: SAFETY ADULT  Goal: Patient will remain free of falls  Description: INTERVENTIONS:  - Assess patient frequently for physical needs  -  Identify cognitive and physical deficits and behaviors that affect risk of falls    -  [place] fall precautions as indicated by assessment   - Educate patient/family on patient safety including physical limitations  - Instruct patient to call for assistance with activity based on assessment  - Modify environment to reduce risk of injury  - Consider OT/PT consult to assist with strengthening/mobility  Outcome: Progressing  Goal: Maintain or return to baseline ADL function  Description: INTERVENTIONS:  -  Assess patient's ability to carry out ADLs; assess patient's baseline for ADL function and identify physical deficits which impact ability to perform ADLs (bathing, care of mouth/teeth, toileting, grooming, dressing, etc )  - Assess/evaluate cause of self-care deficits   - Assess range of motion  - Assess patient's mobility; develop plan if impaired  - Assess patient's need for assistive devices and provide as appropriate  - Encourage maximum independence but intervene and supervise when necessary  - Involve family in performance of ADLs  - Assess for home care needs following discharge   - Consider OT consult to assist with ADL evaluation and planning for discharge  - Provide patient education as appropriate  Outcome: Progressing  Goal: Maintain or return mobility status to optimal level  Description: INTERVENTIONS:  - Assess patient's baseline mobility status (ambulation, transfers, stairs, etc )    - Identify cognitive and physical deficits and behaviors that affect mobility  - Identify mobility aids required to assist with transfers and/or ambulation (gait belt, sit-to-stand, lift, walker, cane, etc )  - [place] fall precautions as indicated by assessment  - Record patient progress and toleration of activity level on Mobility SBAR; progress patient to next Phase/Stage  - Instruct patient to call for assistance with activity based on assessment  - Consider rehabilitation consult to assist with strengthening/weightbearing, etc   Outcome: Progressing     Problem: DISCHARGE PLANNING  Goal: Discharge to home or other facility with appropriate resources  Description: INTERVENTIONS:  - Identify barriers to discharge w/patient and caregiver  - Arrange for needed discharge resources and transportation as appropriate  - Identify discharge learning needs (meds, wound care, etc )  - Arrange for interpretive services to assist at discharge as needed  - Refer to Case Management Department for coordinating discharge planning if the patient needs post-hospital services based on physician/advanced practitioner order or complex needs related to functional status, cognitive ability, or social support system  Outcome: Progressing     Problem: Knowledge Deficit  Goal: Patient/family/caregiver demonstrates understanding of disease process, treatment plan, medications, and discharge instructions  Description: Complete learning assessment and assess knowledge base    Interventions:  - Provide teaching at level of understanding  - Provide teaching via preferred learning methods  Outcome: Progressing     Problem: METABOLIC, FLUID AND ELECTROLYTES - ADULT  Goal: Electrolytes maintained within normal limits  Description: INTERVENTIONS:  - Monitor labs and assess patient for signs and symptoms of electrolyte imbalances  - Administer electrolyte replacement as ordered  - Monitor response to electrolyte replacements, including repeat lab results as appropriate  - Instruct patient on fluid and nutrition as appropriate  Outcome: Progressing  Goal: Fluid balance maintained  Description: INTERVENTIONS:  - Monitor labs   - Monitor I/O and WT  - Instruct patient on fluid and nutrition as appropriate  - Assess for signs & symptoms of volume excess or deficit  Outcome: Progressing     Problem: MUSCULOSKELETAL - ADULT  Goal: Maintain or return mobility to safest level of function  Description: INTERVENTIONS:  - Assess patient's ability to carry out ADLs; assess patient's baseline for ADL function and identify physical deficits which impact ability to perform ADLs (bathing, care of mouth/teeth, toileting, grooming, dressing, etc )  - Assess/evaluate cause of self-care deficits   - Assess range of motion  - Assess patient's mobility  - Assess patient's need for assistive devices and provide as appropriate  - Encourage maximum independence but intervene and supervise when necessary  - Involve family in performance of ADLs  - Assess for home care needs following discharge   - Consider OT consult to assist with ADL evaluation and planning for discharge  - Provide patient education as appropriate  Outcome: Progressing

## 2020-09-01 NOTE — ED NOTES
Daughter gave pt own atorvastatin 40 mg, galantamine 8 mg, tamsulosin 0 4mg, allopurinol 300 mg, docusate 100 mg   Approved by Dr Pattie Gil, [facility]  08/31/20 1118

## 2020-09-01 NOTE — CASE MANAGEMENT
Met with pt to discuss role as  in helping pt to develop discharge plan and to help pt carry out their plan  Pt lives with his wife  Pt's daughter helps care for the patient  Pt has 6+6 IFRAH   Pt has everything set up on the first floor  Pt has a hospital bed ,walker and a wheel chair at home  Pt's wife usually lifts the patient into wheel chair then he rolls around the rest of the day  Pt's daughter and wife help bathe the patient  Pt has had home care in the past but they are not sure what agency it was   Pt went to [facility] in 2019  Pt's daughter drives him to appointments  Pt usually gets meds from [facility]  Pt uses [facility] in [place] when necessary  Pt's PCP is at the [facility] but his wife can remember her name  Pt was given Meds to [facility] and discharge check list   Both were reviewed with patient with good understanding  I will continue to follow for any Case management needs

## 2020-09-02 ENCOUNTER — TELEPHONE (OUTPATIENT)
Dept: NEUROLOGY | Facility: CLINIC | Age: 75
End: 2020-09-02

## 2020-09-02 ENCOUNTER — PATIENT OUTREACH (OUTPATIENT)
Dept: INTERNAL MEDICINE CLINIC | Facility: CLINIC | Age: 75
End: 2020-09-02

## 2020-09-02 DIAGNOSIS — Z71.89 COMPLEX CARE COORDINATION: Primary | ICD-10-CM

## 2020-09-02 NOTE — TELEPHONE ENCOUNTER
Telephone call to [facility] and left a voicemail message for her to return my call about an appointment for 9/16/20 @ 8am for patient to be seen

## 2020-09-02 NOTE — TELEPHONE ENCOUNTER
----- Message from kajeet Andare sent at 9/1/2020  2:00 PM EDT -----  Regarding: please set up with an appt and call daughter  Pt is being dc'd home today and needs an appt if you can call daughter Gavino Morse to schedule  Her phone number is [phone]  Pt with dementia dx

## 2020-09-02 NOTE — PROGRESS NOTES
Outpatient Care Management Note:  In basket message received regarding IP CM referral   Chart review completed

## 2020-09-03 ENCOUNTER — PATIENT OUTREACH (OUTPATIENT)
Dept: INTERNAL MEDICINE CLINIC | Facility: CLINIC | Age: 75
End: 2020-09-03

## 2020-09-04 ENCOUNTER — TELEPHONE (OUTPATIENT)
Dept: NEUROLOGY | Facility: CLINIC | Age: 75
End: 2020-09-04

## 2020-09-04 NOTE — TELEPHONE ENCOUNTER
3rd attempt to reach the daughter Esther Pedroza to offer her an appointment for the patient  I left 3 messages and she has not returned any of my calls

## 2020-09-06 LAB
BACTERIA BLD CULT: NORMAL
BACTERIA BLD CULT: NORMAL

## 2020-09-08 NOTE — TELEPHONE ENCOUNTER
I called and left another message for the patients daughter to return my call about the patients appointment

## 2020-09-15 ENCOUNTER — PATIENT OUTREACH (OUTPATIENT)
Dept: CASE MANAGEMENT | Facility: OTHER | Age: 75
End: 2020-09-15

## 2020-09-15 NOTE — PROGRESS NOTES
Outpatient Care Management Note:  No response to outreach calls  Has not established care with [facility]  Closing care management episode at this time

## 2020-10-27 ENCOUNTER — TRANSCRIBE ORDERS (OUTPATIENT)
Dept: LAB | Facility: CLINIC | Age: 75
End: 2020-10-27

## 2020-10-27 ENCOUNTER — LAB (OUTPATIENT)
Dept: LAB | Facility: CLINIC | Age: 75
End: 2020-10-27
Payer: MEDICARE

## 2020-10-27 DIAGNOSIS — Z20.822 ENCOUNTER FOR SCREENING LABORATORY TESTING FOR COVID-19 VIRUS IN ASYMPTOMATIC PATIENT: Primary | ICD-10-CM

## 2020-10-27 DIAGNOSIS — Z20.822 ENCOUNTER FOR SCREENING LABORATORY TESTING FOR COVID-19 VIRUS IN ASYMPTOMATIC PATIENT: ICD-10-CM

## 2020-10-27 LAB — SARS-COV-2 RNA RESP QL NAA+PROBE: NEGATIVE

## 2020-10-27 PROCEDURE — U0003 INFECTIOUS AGENT DETECTION BY NUCLEIC ACID (DNA OR RNA); SEVERE ACUTE RESPIRATORY SYNDROME CORONAVIRUS 2 (SARS-COV-2) (CORONAVIRUS DISEASE [COVID-19]), AMPLIFIED PROBE TECHNIQUE, MAKING USE OF HIGH THROUGHPUT TECHNOLOGIES AS DESCRIBED BY CMS-2020-01-R: HCPCS

## 2020-10-27 PROCEDURE — 36415 COLL VENOUS BLD VENIPUNCTURE: CPT

## 2020-11-14 PROCEDURE — 87635 SARS-COV-2 COVID-19 AMP PRB: CPT | Performed by: INTERNAL MEDICINE

## 2020-11-15 ENCOUNTER — LAB REQUISITION (OUTPATIENT)
Dept: LAB | Facility: HOSPITAL | Age: 75
End: 2020-11-15
Payer: MEDICARE

## 2020-11-15 DIAGNOSIS — U07.1 COVID-19: ICD-10-CM

## 2020-11-15 LAB — SARS-COV-2 RNA RESP QL NAA+PROBE: NEGATIVE

## 2021-02-12 DIAGNOSIS — Z23 ENCOUNTER FOR IMMUNIZATION: ICD-10-CM

## 2021-04-16 ENCOUNTER — APPOINTMENT (EMERGENCY)
Dept: CT IMAGING | Facility: HOSPITAL | Age: 76
End: 2021-04-16
Payer: MEDICARE

## 2021-04-16 ENCOUNTER — HOSPITAL ENCOUNTER (EMERGENCY)
Facility: HOSPITAL | Age: 76
Discharge: HOME | End: 2021-04-16
Attending: EMERGENCY MEDICINE
Payer: MEDICARE

## 2021-04-16 VITALS
SYSTOLIC BLOOD PRESSURE: 130 MMHG | HEART RATE: 91 BPM | WEIGHT: 183 LBS | BODY MASS INDEX: 24.79 KG/M2 | OXYGEN SATURATION: 97 % | DIASTOLIC BLOOD PRESSURE: 79 MMHG | TEMPERATURE: 97.6 F | RESPIRATION RATE: 19 BRPM | HEIGHT: 72 IN

## 2021-04-16 DIAGNOSIS — R41.89 UNRESPONSIVE EPISODE: Primary | ICD-10-CM

## 2021-04-16 DIAGNOSIS — S00.81XA ABRASION OF FOREHEAD, INITIAL ENCOUNTER: ICD-10-CM

## 2021-04-16 DIAGNOSIS — F03.90 DEMENTIA (HCC): ICD-10-CM

## 2021-04-16 DIAGNOSIS — N39.0 UTI (URINARY TRACT INFECTION): ICD-10-CM

## 2021-04-16 LAB
ALBUMIN SERPL BCP-MCNC: 4.3 G/DL (ref 3.5–5.7)
ALP SERPL-CCNC: 73 U/L (ref 55–165)
ALT SERPL W P-5'-P-CCNC: 23 U/L (ref 7–52)
ANION GAP SERPL CALCULATED.3IONS-SCNC: 9 MMOL/L (ref 4–13)
AST SERPL W P-5'-P-CCNC: 18 U/L (ref 13–39)
BACTERIA UR QL AUTO: ABNORMAL /HPF
BASOPHILS # BLD AUTO: 0.1 THOUSANDS/ΜL (ref 0–0.1)
BASOPHILS NFR BLD AUTO: 1 % (ref 0–2)
BILIRUB SERPL-MCNC: 1 MG/DL (ref 0.2–1)
BILIRUB UR QL STRIP: NEGATIVE
BNP SERPL-MCNC: 47 PG/ML (ref 1–100)
BUN SERPL-MCNC: 16 MG/DL (ref 7–25)
CALCIUM SERPL-MCNC: 9.3 MG/DL (ref 8.6–10.5)
CHLORIDE SERPL-SCNC: 104 MMOL/L (ref 98–107)
CLARITY UR: CLEAR
CO2 SERPL-SCNC: 27 MMOL/L (ref 21–31)
COLOR UR: YELLOW
CREAT SERPL-MCNC: 0.74 MG/DL (ref 0.7–1.3)
EOSINOPHIL # BLD AUTO: 0.1 THOUSAND/ΜL (ref 0–0.61)
EOSINOPHIL NFR BLD AUTO: 1 % (ref 0–5)
ERYTHROCYTE [DISTWIDTH] IN BLOOD BY AUTOMATED COUNT: 13.5 % (ref 11.5–14.5)
FLUAV RNA RESP QL NAA+PROBE: NEGATIVE
FLUBV RNA RESP QL NAA+PROBE: NEGATIVE
GFR SERPL CREATININE-BSD FRML MDRD: 90 ML/MIN/1.73SQ M
GLUCOSE SERPL-MCNC: 115 MG/DL (ref 65–99)
GLUCOSE UR STRIP-MCNC: NEGATIVE MG/DL
HCT VFR BLD AUTO: 42 % (ref 42–47)
HGB BLD-MCNC: 14.1 G/DL (ref 14–18)
HGB UR QL STRIP.AUTO: NEGATIVE
KETONES UR STRIP-MCNC: NEGATIVE MG/DL
LEUKOCYTE ESTERASE UR QL STRIP: NEGATIVE
LYMPHOCYTES # BLD AUTO: 1 THOUSANDS/ΜL (ref 0.6–4.47)
LYMPHOCYTES NFR BLD AUTO: 11 % (ref 21–51)
MAGNESIUM SERPL-MCNC: 2 MG/DL (ref 1.9–2.7)
MCH RBC QN AUTO: 31.1 PG (ref 26–34)
MCHC RBC AUTO-ENTMCNC: 33.6 G/DL (ref 31–37)
MCV RBC AUTO: 93 FL (ref 81–99)
MONOCYTES # BLD AUTO: 0.6 THOUSAND/ΜL (ref 0.17–1.22)
MONOCYTES NFR BLD AUTO: 7 % (ref 2–12)
NEUTROPHILS # BLD AUTO: 7.1 THOUSANDS/ΜL (ref 1.4–6.5)
NEUTS SEG NFR BLD AUTO: 81 % (ref 42–75)
NITRITE UR QL STRIP: POSITIVE
NON-SQ EPI CELLS URNS QL MICRO: ABNORMAL /HPF
PH UR STRIP.AUTO: 6.5 [PH]
PLATELET # BLD AUTO: 145 THOUSANDS/UL (ref 149–390)
PMV BLD AUTO: 7.8 FL (ref 8.6–11.7)
POTASSIUM SERPL-SCNC: 4.1 MMOL/L (ref 3.5–5.5)
PROT SERPL-MCNC: 6.8 G/DL (ref 6.4–8.9)
PROT UR STRIP-MCNC: NEGATIVE MG/DL
RBC # BLD AUTO: 4.54 MILLION/UL (ref 4.3–5.9)
RBC #/AREA URNS AUTO: ABNORMAL /HPF
RSV RNA RESP QL NAA+PROBE: NEGATIVE
SARS-COV-2 RNA RESP QL NAA+PROBE: NEGATIVE
SODIUM SERPL-SCNC: 140 MMOL/L (ref 134–143)
SP GR UR STRIP.AUTO: 1.01 (ref 1–1.03)
TROPONIN I SERPL-MCNC: <0.03 NG/ML
UROBILINOGEN UR QL STRIP.AUTO: 1 E.U./DL
WBC # BLD AUTO: 8.8 THOUSAND/UL (ref 4.8–10.8)
WBC #/AREA URNS AUTO: ABNORMAL /HPF

## 2021-04-16 PROCEDURE — 81001 URINALYSIS AUTO W/SCOPE: CPT | Performed by: PHYSICIAN ASSISTANT

## 2021-04-16 PROCEDURE — G1004 CDSM NDSC: HCPCS

## 2021-04-16 PROCEDURE — 74177 CT ABD & PELVIS W/CONTRAST: CPT

## 2021-04-16 PROCEDURE — 80053 COMPREHEN METABOLIC PANEL: CPT | Performed by: PHYSICIAN ASSISTANT

## 2021-04-16 PROCEDURE — 96361 HYDRATE IV INFUSION ADD-ON: CPT

## 2021-04-16 PROCEDURE — 99285 EMERGENCY DEPT VISIT HI MDM: CPT

## 2021-04-16 PROCEDURE — 96374 THER/PROPH/DIAG INJ IV PUSH: CPT

## 2021-04-16 PROCEDURE — 87086 URINE CULTURE/COLONY COUNT: CPT | Performed by: PHYSICIAN ASSISTANT

## 2021-04-16 PROCEDURE — 87077 CULTURE AEROBIC IDENTIFY: CPT | Performed by: PHYSICIAN ASSISTANT

## 2021-04-16 PROCEDURE — 70450 CT HEAD/BRAIN W/O DYE: CPT

## 2021-04-16 PROCEDURE — 0241U HB NFCT DS VIR RESP RNA 4 TRGT: CPT | Performed by: PHYSICIAN ASSISTANT

## 2021-04-16 PROCEDURE — 84484 ASSAY OF TROPONIN QUANT: CPT | Performed by: PHYSICIAN ASSISTANT

## 2021-04-16 PROCEDURE — 85025 COMPLETE CBC W/AUTO DIFF WBC: CPT | Performed by: PHYSICIAN ASSISTANT

## 2021-04-16 PROCEDURE — 99285 EMERGENCY DEPT VISIT HI MDM: CPT | Performed by: PHYSICIAN ASSISTANT

## 2021-04-16 PROCEDURE — 36415 COLL VENOUS BLD VENIPUNCTURE: CPT | Performed by: PHYSICIAN ASSISTANT

## 2021-04-16 PROCEDURE — 83735 ASSAY OF MAGNESIUM: CPT | Performed by: PHYSICIAN ASSISTANT

## 2021-04-16 PROCEDURE — 93005 ELECTROCARDIOGRAM TRACING: CPT

## 2021-04-16 PROCEDURE — 87186 SC STD MICRODIL/AGAR DIL: CPT | Performed by: PHYSICIAN ASSISTANT

## 2021-04-16 PROCEDURE — 83880 ASSAY OF NATRIURETIC PEPTIDE: CPT | Performed by: PHYSICIAN ASSISTANT

## 2021-04-16 RX ORDER — FINASTERIDE 5 MG/1
5 TABLET, FILM COATED ORAL DAILY
COMMUNITY

## 2021-04-16 RX ORDER — SULFAMETHOXAZOLE AND TRIMETHOPRIM 800; 160 MG/1; MG/1
1 TABLET ORAL DAILY
Qty: 5 TABLET | Refills: 0 | Status: SHIPPED | OUTPATIENT
Start: 2021-04-16 | End: 2021-04-21

## 2021-04-16 RX ORDER — SERTRALINE HYDROCHLORIDE 100 MG/1
100 TABLET, FILM COATED ORAL DAILY
COMMUNITY

## 2021-04-16 RX ADMIN — IOHEXOL 100 ML: 350 INJECTION, SOLUTION INTRAVENOUS at 10:51

## 2021-04-16 RX ADMIN — SODIUM CHLORIDE 500 ML: 0.9 INJECTION, SOLUTION INTRAVENOUS at 10:17

## 2021-04-16 RX ADMIN — MORPHINE SULFATE 2 MG: 2 INJECTION, SOLUTION INTRAMUSCULAR; INTRAVENOUS at 10:19

## 2021-04-16 NOTE — ED PROVIDER NOTES
History  Chief Complaint   Patient presents with    Syncope     patient had syncopal episode while eating breakfast  slumped forward  has abrasion to forhead     58-year-old male with a history of dementia presents emerged department via EMS from a nursing facility with concern for what was described to be a brief episode of syncope in which the patient slumped forward during breakfast causing him to come to rest on the dining table  There is a minimal superficial abrasion to the patient's forehead however he is awake alert he is mentating to his baseline  He is pleasantly confused  He actively denies any complaints and states that he feels well  Unable to obtain ROS secondary to patient's dementia  The patient did not fall  He remained seated in his chair when he slumped forward  Prior to Admission Medications   Prescriptions Last Dose Informant Patient Reported?  Taking?   allopurinol (ZYLOPRIM) 300 mg tablet   Yes No   Sig: Take 300 mg by mouth daily   amLODIPine (NORVASC) 10 mg tablet   Yes No   Sig: Take 10 mg by mouth daily   aspirin 81 mg chewable tablet   No No   Sig: Chew 1 tablet (81 mg total) daily   atorvastatin (LIPITOR) 40 mg tablet   No No   Sig: Take 1 tablet (40 mg total) by mouth every evening   cyanocobalamin (VITAMIN B-12) 1000 MCG tablet   Yes No   Sig: Take 1,000 mcg by mouth daily   finasteride (PROSCAR) 5 mg tablet   Yes Yes   Sig: Take 5 mg by mouth daily   galantamine (RAZADYNE) 8 mg tablet   Yes No   Sig: Take 8 mg by mouth 2 (two) times a day   omeprazole (PriLOSEC) 20 mg delayed release capsule   Yes No   Sig: Take 20 mg by mouth daily   sertraline (ZOLOFT) 100 mg tablet   Yes Yes   Sig: Take 100 mg by mouth daily   tamsulosin (FLOMAX) 0 4 mg   No No   Sig: Take 1 capsule (0 4 mg total) by mouth daily with dinner      Facility-Administered Medications: None       Past Medical History:   Diagnosis Date    GERD (gastroesophageal reflux disease)     Hypertension History reviewed  No pertinent surgical history  History reviewed  No pertinent family history  I have reviewed and agree with the history as documented  E-Cigarette/Vaping    E-Cigarette Use Never User      E-Cigarette/Vaping Substances     Social History     Tobacco Use    Smoking status: Former Smoker    Smokeless tobacco: Never Used   Substance Use Topics    Alcohol use: Not Currently     Frequency: Never    Drug use: Never       Review of Systems   Unable to perform ROS: Dementia       Physical Exam  Physical Exam  Vitals signs and nursing note reviewed  Constitutional:       General: He is not in acute distress  Appearance: He is well-developed  He is not ill-appearing  HENT:      Head: Normocephalic and atraumatic  Right Ear: External ear normal       Left Ear: External ear normal       Nose: Nose normal    Eyes:      Pupils: Pupils are equal, round, and reactive to light  Neck:      Musculoskeletal: Normal range of motion and neck supple  Cardiovascular:      Rate and Rhythm: Normal rate and regular rhythm  Heart sounds: Normal heart sounds  No murmur  No friction rub  No gallop  Pulmonary:      Effort: Pulmonary effort is normal  No respiratory distress  Breath sounds: Normal breath sounds  No stridor  No wheezing or rales  Abdominal:      General: Bowel sounds are normal  There is no distension  Palpations: Abdomen is soft  Tenderness: There is no abdominal tenderness  There is no guarding  Musculoskeletal: Normal range of motion  General: No tenderness  Skin:     General: Skin is warm  Capillary Refill: Capillary refill takes less than 2 seconds  Neurological:      Mental Status: He is alert and oriented to person, place, and time  Psychiatric:         Behavior: Behavior is cooperative           Vital Signs  ED Triage Vitals [04/16/21 0949]   Temperature Pulse Respirations Blood Pressure SpO2   97 6 °F (36 4 °C) 72 18 127/65 98 %      Temp Source Heart Rate Source Patient Position - Orthostatic VS BP Location FiO2 (%)   Tympanic Monitor Sitting Left arm --      Pain Score       No Pain           Vitals:    04/16/21 0949 04/16/21 1130 04/16/21 1200 04/16/21 1230   BP: 127/65 149/70 165/98 130/79   Pulse: 72 89 100 91   Patient Position - Orthostatic VS: Sitting            Visual Acuity      ED Medications  Medications   sodium chloride 0 9 % bolus 500 mL (0 mL Intravenous Stopped 4/16/21 1146)   morphine injection 2 mg (2 mg Intravenous Given 4/16/21 1019)   iohexol (OMNIPAQUE) 350 MG/ML injection (SINGLE-DOSE) 100 mL (100 mL Intravenous Given 4/16/21 1051)       Diagnostic Studies  Results Reviewed     Procedure Component Value Units Date/Time    Urine culture [[id number]] Collected: 04/16/21 1206    Lab Status: In process Specimen: Urine, Other Updated: 04/16/21 1258    Urine Microscopic [[id number]]  (Abnormal) Collected: 04/16/21 1206    Lab Status: Final result Specimen: Urine, Other Updated: 04/16/21 1253     RBC, UA None Seen /hpf      WBC, UA 10-20 /hpf      Epithelial Cells None Seen /hpf      Bacteria, UA Innumerable /hpf     UA (URINE) with reflex to Scope [[id number]]  (Abnormal) Collected: 04/16/21 1206    Lab Status: Final result Specimen: Urine, Other Updated: 04/16/21 1234     Color, UA Yellow     Clarity, UA Clear     Specific Gravity, UA 1 010     pH, UA 6 5     Leukocytes, UA Negative     Nitrite, UA Positive     Protein, UA Negative mg/dl      Glucose, UA Negative mg/dl      Ketones, UA Negative mg/dl      Urobilinogen, UA 1 0 E U /dl      Bilirubin, UA Negative     Blood, UA Negative    COVID19, Influ[facility]/B, [facility] [[id number]]  (Normal) Collected: 04/16/21 1000    Lab Status: Final result Specimen: Nares from Nasopharyngeal Swab Updated: 04/16/21 1050     SARS-CoV-2 Negative     INFLUENZA A PCR Negative     INFLUENZA B PCR Negative     RSV PCR Negative    Narrative:        This test has been authorized by FDA under an EUA (Emergency Use Assay) for use by authorized laboratories  Clinical caution and judgement should be used with the interpretation of these results with consideration of the clinical impression and other laboratory testing  Testing reported as "Positive" or "Negative" has been proven to be accurate according to standard laboratory validation requirements  All testing is performed with control materials showing appropriate reactivity at standard intervals      B-Type Natriuretic Peptide [facility] ONLY) [[id number]]  (Normal) Collected: 04/16/21 1000    Lab Status: Final result Specimen: Blood from Arm, Left Updated: 04/16/21 1030     BNP 47 pg/mL     Troponin I [[id number]]  (Normal) Collected: 04/16/21 1000    Lab Status: Final result Specimen: Blood from Arm, Left Updated: 04/16/21 1028     Troponin I <0 03 ng/mL     Comprehensive metabolic panel [[id number]]  (Abnormal) Collected: 04/16/21 1000    Lab Status: Final result Specimen: Blood from Arm, Left Updated: 04/16/21 1025     Sodium 140 mmol/L      Potassium 4 1 mmol/L      Chloride 104 mmol/L      CO2 27 mmol/L      ANION GAP 9 mmol/L      BUN 16 mg/dL      Creatinine 0 74 mg/dL      Glucose 115 mg/dL      Calcium 9 3 mg/dL      AST 18 U/L      ALT 23 U/L      Alkaline Phosphatase 73 U/L      Total Protein 6 8 g/dL      Albumin 4 3 g/dL      Total Bilirubin 1 00 mg/dL      eGFR 90 ml/min/1 73sq m     Narrative:      Messi guidelines for Chronic Kidney Disease (CKD):     Stage 1 with normal or high GFR (GFR > 90 mL/min/1 73 square meters)    Stage 2 Mild CKD (GFR = 60-89 mL/min/1 73 square meters)    Stage 3A Moderate CKD (GFR = 45-59 mL/min/1 73 square meters)    Stage 3B Moderate CKD (GFR = 30-44 mL/min/1 73 square meters)    Stage 4 Severe CKD (GFR = 15-29 mL/min/1 73 square meters)    Stage 5 End Stage CKD (GFR <15 mL/min/1 73 square meters)  Note: GFR calculation is accurate only with a steady state creatinine    Magnesium [[id number]]  (Normal) Collected: 04/16/21 1000    Lab Status: Final result Specimen: Blood from Arm, Left Updated: 04/16/21 1024     Magnesium 2 0 mg/dL     CBC and differential [[id number]]  (Abnormal) Collected: 04/16/21 1000    Lab Status: Final result Specimen: Blood from Arm, Left Updated: 04/16/21 1014     WBC 8 80 Thousand/uL      RBC 4 54 Million/uL      Hemoglobin 14 1 g/dL      Hematocrit 42 0 %      MCV 93 fL      MCH 31 1 pg      MCHC 33 6 g/dL      RDW 13 5 %      MPV 7 8 fL      Platelets 050 Thousands/uL      Neutrophils Relative 81 %      Lymphocytes Relative 11 %      Monocytes Relative 7 %      Eosinophils Relative 1 %      Basophils Relative 1 %      Neutrophils Absolute 7 10 Thousands/µL      Lymphocytes Absolute 1 00 Thousands/µL      Monocytes Absolute 0 60 Thousand/µL      Eosinophils Absolute 0 10 Thousand/µL      Basophils Absolute 0 10 Thousands/µL                  CT abdomen pelvis with contrast   Final Result by Sebastián Smith MD (04/16 1112)      No acute pathology  Nonobstructing bilateral renal calculi  Workstation performed: [id number]         CT head without contrast   Final Result by Adelina Arizmendi MD (04/16 1130)      No acute intracranial abnormality  Workstation performed: [id number]                    Procedures  Procedures         ED Course                                           MDM  Number of Diagnoses or Management Options  Abrasion of forehead, initial encounter:   Dementia ([address] ):   Unresponsive episode:   UTI (urinary tract infection):   Diagnosis management comments: Benign physical exam   Probable UTI  Bactrim provided  Patient's daughter is present at bedside after initial workup was completed  She states that he has had several episodes of this in the past and this is not a new occurrence for him  She states these are not usually syncopal episodes however he will sporadically lean forward at random    She cites his dementia as a cause for this  She states this behavior has been present for a little while  She expresses no concern for this behavior at this time and wishes he be returned to the nursing facility  Patient has unremarkable workup with the exception of possible mild UTI  Admission for this patient was offered to the daughter however she declines this citing that this is normal behavior for him  Patient cleared for discharge home back to nursing facility  During his time in the emergency department the patient remained awake alert without any of these episodes  No arrhythmias is identified during his stay in the ED  Amount and/or Complexity of Data Reviewed  Clinical lab tests: ordered and reviewed  Tests in the radiology section of CPT®: ordered and reviewed    Risk of Complications, Morbidity, and/or Mortality  Presenting problems: moderate  Diagnostic procedures: low  Management options: low    Patient Progress  Patient progress: stable      Disposition  Final diagnoses:   Unresponsive episode   Dementia ([email or web address] )   Abrasion of forehead, initial encounter   UTI (urinary tract infection)     Time reflects when diagnosis was documented in both MDM as applicable and the Disposition within this note     Time User Action Codes Description Comment    4/16/2021 12:22 PM Reda Arik Add [R55] Near syncope     4/16/2021 12:22 PM Yelitza Smith Remove [R55] Near syncope     4/16/2021 12:22 PM Reda Arik Add [R41 89] Unresponsive episode     4/16/2021 12:22 PM Reda Arik Add [F03 90] Dementia ([facility] [address] )     4/16/2021 12:23 PM Reda Arik Add [S00 81XA] Abrasion of forehead, initial encounter     4/16/2021 12:40 PM Reda Arik Add [N39 0] UTI (urinary tract infection)       ED Disposition     ED Disposition Condition Date/Time Comment    Discharge Stable Fri Apr 16, 2021 12:22 PM Colt Burdick discharge to home/self care              Follow-up Information     Follow up With Specialties Details Why Contact Info    Garland Garvin MD Internal Medicine   [address]  [place] [address]  [phone]            Discharge Medication List as of 4/16/2021 12:42 PM      START taking these medications    Details   sulfamethoxazole-trimethoprim (BACTRIM DS) 800-160 mg per tablet Take 1 tablet by mouth daily for 5 days smx-tmp DS (BACTRIM) 800-160 mg tabs (1tab q12 D10), Starting Fri 4/16/2021, Until Wed 4/21/2021, Normal         CONTINUE these medications which have NOT CHANGED    Details   finasteride (PROSCAR) 5 mg tablet Take 5 mg by mouth daily, Historical Med      sertraline (ZOLOFT) 100 mg tablet Take 100 mg by mouth daily, Historical Med      allopurinol (ZYLOPRIM) 300 mg tablet Take 300 mg by mouth daily, Historical Med      amLODIPine (NORVASC) 10 mg tablet Take 10 mg by mouth daily, Historical Med      aspirin 81 mg chewable tablet Chew 1 tablet (81 mg total) daily, Starting Sat 12/28/2019, Normal      atorvastatin (LIPITOR) 40 mg tablet Take 1 tablet (40 mg total) by mouth every evening, Starting Fri 12/27/2019, Normal      cyanocobalamin (VITAMIN B-12) 1000 MCG tablet Take 1,000 mcg by mouth daily, Historical Med      galantamine (RAZADYNE) 8 mg tablet Take 8 mg by mouth 2 (two) times a day, Historical Med      omeprazole (PriLOSEC) 20 mg delayed release capsule Take 20 mg by mouth daily, Historical Med      tamsulosin (FLOMAX) 0 4 mg Take 1 capsule (0 4 mg total) by mouth daily with dinner, Starting Fri 12/27/2019, Normal           No discharge procedures on file      PDMP Review     None          ED Provider  Electronically Signed by           Campos Aquino PA-C  04/16/21 0046

## 2021-04-17 LAB
ATRIAL RATE: 441 BPM
QRS AXIS: -50 DEGREES
QRSD INTERVAL: 86 MS
QT INTERVAL: 394 MS
QTC INTERVAL: 454 MS
T WAVE AXIS: 71 DEGREES
VENTRICULAR RATE: 80 BPM

## 2021-04-17 PROCEDURE — 93010 ELECTROCARDIOGRAM REPORT: CPT | Performed by: INTERNAL MEDICINE

## 2021-04-18 LAB — BACTERIA UR CULT: ABNORMAL

## 2021-04-20 RX ORDER — NITROFURANTOIN 25; 75 MG/1; MG/1
100 CAPSULE ORAL 2 TIMES DAILY
Qty: 10 CAPSULE | Refills: 0 | Status: SHIPPED | OUTPATIENT
Start: 2021-04-20

## 2021-09-08 ENCOUNTER — APPOINTMENT (EMERGENCY)
Dept: CT IMAGING | Facility: HOSPITAL | Age: 76
End: 2021-09-08
Payer: COMMERCIAL

## 2021-09-08 ENCOUNTER — HOSPITAL ENCOUNTER (EMERGENCY)
Facility: HOSPITAL | Age: 76
Discharge: NURSING, REHAB OR LONG-TERM CARE | End: 2021-09-09
Attending: EMERGENCY MEDICINE | Admitting: EMERGENCY MEDICINE
Payer: COMMERCIAL

## 2021-09-08 DIAGNOSIS — N39.0 UTI (URINARY TRACT INFECTION): Primary | ICD-10-CM

## 2021-09-08 LAB
ALBUMIN SERPL BCP-MCNC: 4.5 G/DL (ref 3.5–5.7)
ALP SERPL-CCNC: 120 U/L (ref 55–165)
ALT SERPL W P-5'-P-CCNC: 25 U/L (ref 7–52)
AMMONIA PLAS-SCNC: 44 UMOL/L (ref 25–90)
ANION GAP SERPL CALCULATED.3IONS-SCNC: 10 MMOL/L (ref 4–13)
AST SERPL W P-5'-P-CCNC: 19 U/L (ref 13–39)
ATRIAL RATE: 80 BPM
BACTERIA UR QL AUTO: ABNORMAL /HPF
BASOPHILS # BLD AUTO: 0 THOUSANDS/ΜL (ref 0–0.1)
BASOPHILS NFR BLD AUTO: 0 % (ref 0–2)
BILIRUB SERPL-MCNC: 0.9 MG/DL (ref 0.2–1)
BILIRUB UR QL STRIP: NEGATIVE
BUN SERPL-MCNC: 20 MG/DL (ref 7–25)
CALCIUM SERPL-MCNC: 9.6 MG/DL (ref 8.6–10.5)
CHLORIDE SERPL-SCNC: 103 MMOL/L (ref 98–107)
CLARITY UR: ABNORMAL
CO2 SERPL-SCNC: 24 MMOL/L (ref 21–31)
COLOR UR: YELLOW
CREAT SERPL-MCNC: 0.98 MG/DL (ref 0.7–1.3)
EOSINOPHIL # BLD AUTO: 0 THOUSAND/ΜL (ref 0–0.61)
EOSINOPHIL NFR BLD AUTO: 0 % (ref 0–5)
ERYTHROCYTE [DISTWIDTH] IN BLOOD BY AUTOMATED COUNT: 13.3 % (ref 11.5–14.5)
GFR SERPL CREATININE-BSD FRML MDRD: 74 ML/MIN/1.73SQ M
GLUCOSE SERPL-MCNC: 131 MG/DL (ref 65–99)
GLUCOSE UR STRIP-MCNC: NEGATIVE MG/DL
HCT VFR BLD AUTO: 41.6 % (ref 42–47)
HGB BLD-MCNC: 14 G/DL (ref 14–18)
HGB UR QL STRIP.AUTO: NEGATIVE
KETONES UR STRIP-MCNC: NEGATIVE MG/DL
LEUKOCYTE ESTERASE UR QL STRIP: NEGATIVE
LIPASE SERPL-CCNC: 38 U/L (ref 11–82)
LYMPHOCYTES # BLD AUTO: 0.7 THOUSANDS/ΜL (ref 0.6–4.47)
LYMPHOCYTES NFR BLD AUTO: 9 % (ref 21–51)
MAGNESIUM SERPL-MCNC: 1.8 MG/DL (ref 1.9–2.7)
MCH RBC QN AUTO: 30.2 PG (ref 26–34)
MCHC RBC AUTO-ENTMCNC: 33.7 G/DL (ref 31–37)
MCV RBC AUTO: 90 FL (ref 81–99)
MONOCYTES # BLD AUTO: 0.6 THOUSAND/ΜL (ref 0.17–1.22)
MONOCYTES NFR BLD AUTO: 8 % (ref 2–12)
NEUTROPHILS # BLD AUTO: 6.2 THOUSANDS/ΜL (ref 1.4–6.5)
NEUTS SEG NFR BLD AUTO: 83 % (ref 42–75)
NITRITE UR QL STRIP: POSITIVE
NON-SQ EPI CELLS URNS QL MICRO: ABNORMAL /HPF
P AXIS: 48 DEGREES
PH UR STRIP.AUTO: 7 [PH]
PLATELET # BLD AUTO: 182 THOUSANDS/UL (ref 149–390)
PMV BLD AUTO: 8 FL (ref 8.6–11.7)
POTASSIUM SERPL-SCNC: 4.1 MMOL/L (ref 3.5–5.5)
PR INTERVAL: 144 MS
PROT SERPL-MCNC: 7 G/DL (ref 6.4–8.9)
PROT UR STRIP-MCNC: NEGATIVE MG/DL
QRS AXIS: -1 DEGREES
QRSD INTERVAL: 90 MS
QT INTERVAL: 392 MS
QTC INTERVAL: 452 MS
RBC # BLD AUTO: 4.63 MILLION/UL (ref 4.3–5.9)
RBC #/AREA URNS AUTO: ABNORMAL /HPF
SODIUM SERPL-SCNC: 137 MMOL/L (ref 134–143)
SP GR UR STRIP.AUTO: 1.02 (ref 1–1.03)
T WAVE AXIS: 71 DEGREES
UROBILINOGEN UR QL STRIP.AUTO: 1 E.U./DL
VENTRICULAR RATE: 80 BPM
WBC # BLD AUTO: 7.5 THOUSAND/UL (ref 4.8–10.8)
WBC #/AREA URNS AUTO: ABNORMAL /HPF

## 2021-09-08 PROCEDURE — 82140 ASSAY OF AMMONIA: CPT | Performed by: PHYSICIAN ASSISTANT

## 2021-09-08 PROCEDURE — 93005 ELECTROCARDIOGRAM TRACING: CPT

## 2021-09-08 PROCEDURE — 36415 COLL VENOUS BLD VENIPUNCTURE: CPT | Performed by: PHYSICIAN ASSISTANT

## 2021-09-08 PROCEDURE — 83735 ASSAY OF MAGNESIUM: CPT | Performed by: PHYSICIAN ASSISTANT

## 2021-09-08 PROCEDURE — 87040 BLOOD CULTURE FOR BACTERIA: CPT | Performed by: PHYSICIAN ASSISTANT

## 2021-09-08 PROCEDURE — 83690 ASSAY OF LIPASE: CPT | Performed by: PHYSICIAN ASSISTANT

## 2021-09-08 PROCEDURE — 99284 EMERGENCY DEPT VISIT MOD MDM: CPT | Performed by: PHYSICIAN ASSISTANT

## 2021-09-08 PROCEDURE — 85025 COMPLETE CBC W/AUTO DIFF WBC: CPT | Performed by: PHYSICIAN ASSISTANT

## 2021-09-08 PROCEDURE — 80053 COMPREHEN METABOLIC PANEL: CPT | Performed by: PHYSICIAN ASSISTANT

## 2021-09-08 PROCEDURE — 93010 ELECTROCARDIOGRAM REPORT: CPT | Performed by: INTERNAL MEDICINE

## 2021-09-08 PROCEDURE — 96365 THER/PROPH/DIAG IV INF INIT: CPT

## 2021-09-08 PROCEDURE — 99285 EMERGENCY DEPT VISIT HI MDM: CPT

## 2021-09-08 PROCEDURE — 81001 URINALYSIS AUTO W/SCOPE: CPT | Performed by: PHYSICIAN ASSISTANT

## 2021-09-08 PROCEDURE — 70450 CT HEAD/BRAIN W/O DYE: CPT

## 2021-09-08 RX ORDER — CEFPODOXIME PROXETIL 200 MG/1
200 TABLET, FILM COATED ORAL 2 TIMES DAILY
Qty: 10 TABLET | Refills: 0 | Status: SHIPPED | OUTPATIENT
Start: 2021-09-08 | End: 2021-09-13

## 2021-09-08 RX ORDER — CEPHALEXIN 250 MG/5ML
500 POWDER, FOR SUSPENSION ORAL ONCE
Status: DISCONTINUED | OUTPATIENT
Start: 2021-09-08 | End: 2021-09-08

## 2021-09-08 RX ORDER — CEFEPIME HYDROCHLORIDE 2 G/50ML
2000 INJECTION, SOLUTION INTRAVENOUS ONCE
Status: COMPLETED | OUTPATIENT
Start: 2021-09-08 | End: 2021-09-08

## 2021-09-08 RX ADMIN — CEFEPIME HYDROCHLORIDE 2000 MG: 2 INJECTION, SOLUTION INTRAVENOUS at 17:29

## 2021-09-08 NOTE — ED NOTES
Called facility, spoke to [place], will call back with info for a ride home       Avi Borja, SEDA  09/08/21 5740

## 2021-09-09 VITALS
TEMPERATURE: 97.9 F | SYSTOLIC BLOOD PRESSURE: 148 MMHG | OXYGEN SATURATION: 97 % | DIASTOLIC BLOOD PRESSURE: 67 MMHG | RESPIRATION RATE: 18 BRPM | HEART RATE: 62 BPM

## 2021-09-09 NOTE — ED PROVIDER NOTES
History  Chief Complaint   Patient presents with    Altered Mental Status     as per staff at [facility] care Cost    Urinary Retention     19-year-old male with history of dementia presents to the emergency department via EMS from a personal care home with concern for altered mental status and urinary tension and suspected UTI  He is well-appearing in no acute distress  He is pleasantly confused  Unable to provide history or ROS  Allergies reviewed              Prior to Admission Medications   Prescriptions Last Dose Informant Patient Reported? Taking?   allopurinol (ZYLOPRIM) 300 mg tablet   Yes No   Sig: Take 300 mg by mouth daily   amLODIPine (NORVASC) 10 mg tablet   Yes Yes   Sig: Take 10 mg by mouth daily   aspirin 81 mg chewable tablet   No Yes   Sig: Chew 1 tablet (81 mg total) daily   atorvastatin (LIPITOR) 40 mg tablet   No Yes   Sig: Take 1 tablet (40 mg total) by mouth every evening   cyanocobalamin (VITAMIN B-12) 1000 MCG tablet   Yes Yes   Sig: Take 1,000 mcg by mouth daily   finasteride (PROSCAR) 5 mg tablet   Yes Yes   Sig: Take 5 mg by mouth daily   galantamine (RAZADYNE) 8 mg tablet   Yes Yes   Sig: Take 8 mg by mouth 2 (two) times a day   nitrofurantoin (MACROBID) 100 mg capsule   No No   Sig: Take 1 capsule (100 mg total) by mouth 2 (two) times a day   omeprazole (PriLOSEC) 20 mg delayed release capsule   Yes No   Sig: Take 20 mg by mouth daily   sertraline (ZOLOFT) 100 mg tablet   Yes Yes   Sig: Take 100 mg by mouth daily   tamsulosin (FLOMAX) 0 4 mg   No Yes   Sig: Take 1 capsule (0 4 mg total) by mouth daily with dinner      Facility-Administered Medications: None       Past Medical History:   Diagnosis Date    Cognitive impairment     GERD (gastroesophageal reflux disease)     Hypertension     Major depressive disorder     Parkinson disease ([facility] [address] )        History reviewed  No pertinent surgical history  History reviewed  No pertinent family history    I have reviewed and agree with the history as documented  E-Cigarette/Vaping    E-Cigarette Use Never User      E-Cigarette/Vaping Substances     Social History     Tobacco Use    Smoking status: Former Smoker    Smokeless tobacco: Never Used   Vaping Use    Vaping Use: Never used   Substance Use Topics    Alcohol use: Not Currently    Drug use: Never       Review of Systems   Unable to perform ROS: Dementia       Physical Exam  Physical Exam  Vitals and nursing note reviewed  Constitutional:       General: He is not in acute distress  Appearance: He is well-developed  He is not ill-appearing  HENT:      Head: Normocephalic and atraumatic  Right Ear: External ear normal       Left Ear: External ear normal       Nose: Nose normal    Eyes:      Pupils: Pupils are equal, round, and reactive to light  Cardiovascular:      Rate and Rhythm: Normal rate and regular rhythm  Heart sounds: Normal heart sounds  No murmur heard  No friction rub  No gallop  Pulmonary:      Effort: Pulmonary effort is normal  No respiratory distress  Breath sounds: Normal breath sounds  No stridor  No wheezing or rales  Abdominal:      General: Bowel sounds are normal  There is no distension  Palpations: Abdomen is soft  Tenderness: There is no abdominal tenderness  There is no guarding  Musculoskeletal:         General: No tenderness  Normal range of motion  Cervical back: Normal range of motion and neck supple  Skin:     General: Skin is warm  Capillary Refill: Capillary refill takes less than 2 seconds  Neurological:      Mental Status: He is alert and oriented to person, place, and time  Psychiatric:         Behavior: Behavior is cooperative           Vital Signs  ED Triage Vitals [09/08/21 1414]   Temperature Pulse Respirations Blood Pressure SpO2   97 9 °F (36 6 °C) 76 18 130/63 95 %      Temp Source Heart Rate Source Patient Position - Orthostatic VS BP Location FiO2 (%)   Temporal -- -- -- --      Pain Score       --           Vitals:    09/08/21 1730 09/08/21 1800 09/08/21 1900 09/09/21 0445   BP: 152/71 144/73 143/76 148/67   Pulse: 67 67 66 62         Visual Acuity      ED Medications  Medications   cefepime (MAXIPIME) IVPB (premix in dextrose) 2,000 mg 50 mL (0 mg Intravenous Stopped 9/8/21 1759)       Diagnostic Studies  Results Reviewed     Procedure Component Value Units Date/Time    Blood culture #1 [[id number]] Collected: 09/08/21 1459    Lab Status: Preliminary result Specimen: Blood from Arm, Right Updated: 09/09/21 0001     Blood Culture Received in Microbiology Lab  Culture in Progress  Blood culture #2 [[id number]] Collected: 09/08/21 1450    Lab Status: Preliminary result Specimen: Blood from Arm, Right Updated: 09/09/21 0001     Blood Culture Received in Microbiology Lab  Culture in Progress      Lactic acid [[id number]] Updated: 09/08/21 1537    Lab Status: No result Specimen: Blood from Arm, Right     Comprehensive metabolic panel [[id number]]  (Abnormal) Collected: 09/08/21 1450    Lab Status: Final result Specimen: Blood from Arm, Right Updated: 09/08/21 1533     Sodium 137 mmol/L      Potassium 4 1 mmol/L      Chloride 103 mmol/L      CO2 24 mmol/L      ANION GAP 10 mmol/L      BUN 20 mg/dL      Creatinine 0 98 mg/dL      Glucose 131 mg/dL      Calcium 9 6 mg/dL      AST 19 U/L      ALT 25 U/L      Alkaline Phosphatase 120 U/L      Total Protein 7 0 g/dL      Albumin 4 5 g/dL      Total Bilirubin 0 90 mg/dL      eGFR 74 ml/min/1 73sq m     Narrative:      [place] guidelines for Chronic Kidney Disease (CKD):     Stage 1 with normal or high GFR (GFR > 90 mL/min/1 73 square meters)    Stage 2 Mild CKD (GFR = 60-89 mL/min/1 73 square meters)    Stage 3A Moderate CKD (GFR = 45-59 mL/min/1 73 square meters)    Stage 3B Moderate CKD (GFR = 30-44 mL/min/1 73 square meters)    Stage 4 Severe CKD (GFR = 15-29 mL/min/1 73 square meters)    Stage 5 End Stage CKD (GFR <15 mL/min/1 73 square meters)  Note: GFR calculation is accurate only with a steady state creatinine    Magnesium [[id number]]  (Abnormal) Collected: 09/08/21 1450    Lab Status: Final result Specimen: Blood from Arm, Right Updated: 09/08/21 1532     Magnesium 1 8 mg/dL     Ammonia [[id number]]  (Normal) Collected: 09/08/21 1450    Lab Status: Final result Specimen: Blood from Arm, Right Updated: 09/08/21 1532     Ammonia 44 umol/L     Lipase [[id number]]  (Normal) Collected: 09/08/21 1450    Lab Status: Final result Specimen: Blood from Arm, Right Updated: 09/08/21 1531     Lipase 38 u/L     Urine Microscopic [[id number]]  (Abnormal) Collected: 09/08/21 1506    Lab Status: Final result Specimen: Urine, Straight Cath Updated: 09/08/21 1526     RBC, UA None Seen /hpf      WBC, UA 2-4 /hpf      Epithelial Cells Occasional /hpf      Bacteria, UA Innumerable /hpf     CBC and differential [[id number]]  (Abnormal) Collected: 09/08/21 1450    Lab Status: Final result Specimen: Blood from Arm, Right Updated: 09/08/21 1516     WBC 7 50 Thousand/uL      RBC 4 63 Million/uL      Hemoglobin 14 0 g/dL      Hematocrit 41 6 %      MCV 90 fL      MCH 30 2 pg      MCHC 33 7 g/dL      RDW 13 3 %      MPV 8 0 fL      Platelets 282 Thousands/uL      Neutrophils Relative 83 %      Lymphocytes Relative 9 %      Monocytes Relative 8 %      Eosinophils Relative 0 %      Basophils Relative 0 %      Neutrophils Absolute 6 20 Thousands/µL      Lymphocytes Absolute 0 70 Thousands/µL      Monocytes Absolute 0 60 Thousand/µL      Eosinophils Absolute 0 00 Thousand/µL      Basophils Absolute 0 00 Thousands/µL     UA w Reflex to Microscopic w Reflex to Culture [[id number]]  (Abnormal) Collected: 09/08/21 1506    Lab Status: Final result Specimen: Urine, Straight Cath Updated: 09/08/21 1515     Color, UA Yellow     Clarity, UA Slightly Cloudy     Specific [place], UA 1 020     pH, UA 7 0     Leukocytes, UA Negative     Nitrite, UA Positive     Protein, UA Negative mg/dl      Glucose, UA Negative mg/dl      Ketones, UA Negative mg/dl      Urobilinogen, UA 1 0 E U /dl      Bilirubin, UA Negative     Blood, UA Negative                 CT head without contrast   Final Result by King Hough MD (09/08 1716)      No acute intracranial abnormality  Workstation performed: [id number]                    Procedures  Procedures         ED Course  ED Course as of Sep 09 1500   Wed Sep 08, 2021   1524 Nitrite, UA(!): Positive   1707 Bacteria, UA(!): Innumerable   1855 Will use cefpodoxime as advised by pharmacy  Safe in the setting penicillin allergy  MDM  Number of Diagnoses or Management Options  UTI (urinary tract infection)  Diagnosis management comments: Benign physical examination  UA shows UTI  CT head unremarkable for acute pathology  Will treat with cefpodoxime outpatient  Patient educated regarding their diagnosis and given return and follow-up instructions  Patient was advised to returned to the ED with worsening symptoms or concerns  Patient is understanding of and in agreement with the treatment plan  There are no questions at the time of discharge         Amount and/or Complexity of Data Reviewed  Clinical lab tests: ordered and reviewed  Tests in the radiology section of CPT®: ordered and reviewed    Risk of Complications, Morbidity, and/or Mortality  Presenting problems: low  Diagnostic procedures: low  Management options: low    Patient Progress  Patient progress: stable      Disposition  Final diagnoses:   UTI (urinary tract infection)     Time reflects when diagnosis was documented in both MDM as applicable and the Disposition within this note     Time User Action Codes Description Comment    9/8/2021  5:30 PM Anthony Myers Add [N39 0] UTI (urinary tract infection)       ED Disposition     ED Disposition Condition Date/Time Comment    Discharge Stable Wed Sep 8, 2021  5:34 PM Sophie Sat discharge to home/self care  Follow-up Information     Follow up With Specialties Details Why Contact Info    Fauzia Rodriguez MD Internal Medicine   [address]  [address]  [facility] [address]  [phone]            Discharge Medication List as of 9/8/2021  6:55 PM      START taking these medications    Details   cefpodoxime (VANTIN) 200 mg tablet Take 1 tablet (200 mg total) by mouth 2 (two) times a day for 5 days, Starting Wed 9/8/2021, Until Mon 9/13/2021, Normal         CONTINUE these medications which have NOT CHANGED    Details   amLODIPine (NORVASC) 10 mg tablet Take 10 mg by mouth daily, Historical Med      aspirin 81 mg chewable tablet Chew 1 tablet (81 mg total) daily, Starting Sat 12/28/2019, Normal      atorvastatin (LIPITOR) 40 mg tablet Take 1 tablet (40 mg total) by mouth every evening, Starting Fri 12/27/2019, Normal      cyanocobalamin (VITAMIN B-12) 1000 MCG tablet Take 1,000 mcg by mouth daily, Historical Med      finasteride (PROSCAR) 5 mg tablet Take 5 mg by mouth daily, Historical Med      galantamine (RAZADYNE) 8 mg tablet Take 8 mg by mouth 2 (two) times a day, Historical Med      sertraline (ZOLOFT) 100 mg tablet Take 100 mg by mouth daily, Historical Med      tamsulosin (FLOMAX) 0 4 mg Take 1 capsule (0 4 mg total) by mouth daily with dinner, Starting Fri 12/27/2019, Normal      allopurinol (ZYLOPRIM) 300 mg tablet Take 300 mg by mouth daily, Historical Med      nitrofurantoin (MACROBID) 100 mg capsule Take 1 capsule (100 mg total) by mouth 2 (two) times a day, Starting Tue 4/20/2021, Normal      omeprazole (PriLOSEC) 20 mg delayed release capsule Take 20 mg by mouth daily, Historical Med           No discharge procedures on file      PDMP Review     None          ED Provider  Electronically Signed by           Aubree Peter PA-C  09/09/21 9792

## 2021-09-09 NOTE — ED NOTES
COLLEEN serrano scheduled for 1100 on 9/9  Facility notified        Dulce Sutton, SEDA  09/08/21 2014

## 2021-09-13 LAB
BACTERIA BLD CULT: NORMAL
BACTERIA BLD CULT: NORMAL

## 2021-11-11 ENCOUNTER — APPOINTMENT (OUTPATIENT)
Dept: LAB | Facility: CLINIC | Age: 76
End: 2021-11-11
Payer: MEDICARE

## 2021-11-11 DIAGNOSIS — N39.0 URINARY TRACT INFECTION WITHOUT HEMATURIA, SITE UNSPECIFIED: ICD-10-CM

## 2021-11-11 PROCEDURE — 87077 CULTURE AEROBIC IDENTIFY: CPT

## 2021-11-11 PROCEDURE — 87086 URINE CULTURE/COLONY COUNT: CPT

## 2021-11-11 PROCEDURE — 87186 SC STD MICRODIL/AGAR DIL: CPT

## 2021-11-13 LAB — BACTERIA UR CULT: ABNORMAL

## 2022-01-05 ENCOUNTER — APPOINTMENT (OUTPATIENT)
Dept: LAB | Facility: CLINIC | Age: 77
End: 2022-01-05
Payer: MEDICARE

## 2022-06-09 PROCEDURE — U0005 INFEC AGEN DETEC AMPLI PROBE: HCPCS | Performed by: INTERNAL MEDICINE

## 2022-06-09 PROCEDURE — U0003 INFECTIOUS AGENT DETECTION BY NUCLEIC ACID (DNA OR RNA); SEVERE ACUTE RESPIRATORY SYNDROME CORONAVIRUS 2 (SARS-COV-2) (CORONAVIRUS DISEASE [COVID-19]), AMPLIFIED PROBE TECHNIQUE, MAKING USE OF HIGH THROUGHPUT TECHNOLOGIES AS DESCRIBED BY CMS-2020-01-R: HCPCS | Performed by: INTERNAL MEDICINE

## 2022-06-10 ENCOUNTER — LAB REQUISITION (OUTPATIENT)
Dept: LAB | Facility: HOSPITAL | Age: 77
End: 2022-06-10
Payer: MEDICARE

## 2022-06-10 DIAGNOSIS — Z11.59 ENCOUNTER FOR SCREENING FOR OTHER VIRAL DISEASES: ICD-10-CM

## 2022-06-10 DIAGNOSIS — Z03.818 ENCOUNTER FOR OBSERVATION FOR SUSPECTED EXPOSURE TO OTHER BIOLOGICAL AGENTS RULED OUT: ICD-10-CM

## 2022-06-10 LAB — SARS-COV-2 RNA RESP QL NAA+PROBE: NEGATIVE

## 2022-06-15 PROCEDURE — U0003 INFECTIOUS AGENT DETECTION BY NUCLEIC ACID (DNA OR RNA); SEVERE ACUTE RESPIRATORY SYNDROME CORONAVIRUS 2 (SARS-COV-2) (CORONAVIRUS DISEASE [COVID-19]), AMPLIFIED PROBE TECHNIQUE, MAKING USE OF HIGH THROUGHPUT TECHNOLOGIES AS DESCRIBED BY CMS-2020-01-R: HCPCS | Performed by: INTERNAL MEDICINE

## 2022-06-15 PROCEDURE — U0005 INFEC AGEN DETEC AMPLI PROBE: HCPCS | Performed by: INTERNAL MEDICINE

## 2022-06-16 ENCOUNTER — LAB REQUISITION (OUTPATIENT)
Dept: LAB | Facility: HOSPITAL | Age: 77
End: 2022-06-16
Payer: MEDICARE

## 2022-06-16 DIAGNOSIS — Z11.59 ENCOUNTER FOR SCREENING FOR OTHER VIRAL DISEASES: ICD-10-CM

## 2022-06-16 DIAGNOSIS — Z03.818 ENCOUNTER FOR OBSERVATION FOR SUSPECTED EXPOSURE TO OTHER BIOLOGICAL AGENTS RULED OUT: ICD-10-CM

## 2022-06-16 LAB — SARS-COV-2 RNA RESP QL NAA+PROBE: NEGATIVE

## 2022-06-29 PROCEDURE — U0005 INFEC AGEN DETEC AMPLI PROBE: HCPCS | Performed by: INTERNAL MEDICINE

## 2022-06-29 PROCEDURE — U0003 INFECTIOUS AGENT DETECTION BY NUCLEIC ACID (DNA OR RNA); SEVERE ACUTE RESPIRATORY SYNDROME CORONAVIRUS 2 (SARS-COV-2) (CORONAVIRUS DISEASE [COVID-19]), AMPLIFIED PROBE TECHNIQUE, MAKING USE OF HIGH THROUGHPUT TECHNOLOGIES AS DESCRIBED BY CMS-2020-01-R: HCPCS | Performed by: INTERNAL MEDICINE

## 2022-06-30 ENCOUNTER — LAB REQUISITION (OUTPATIENT)
Dept: LAB | Facility: HOSPITAL | Age: 77
End: 2022-06-30
Payer: MEDICARE

## 2022-06-30 DIAGNOSIS — Z11.59 ENCOUNTER FOR SCREENING FOR OTHER VIRAL DISEASES: ICD-10-CM

## 2022-06-30 DIAGNOSIS — Z03.818 ENCOUNTER FOR OBSERVATION FOR SUSPECTED EXPOSURE TO OTHER BIOLOGICAL AGENTS RULED OUT: ICD-10-CM

## 2022-06-30 LAB — SARS-COV-2 RNA RESP QL NAA+PROBE: NEGATIVE

## 2022-07-14 PROCEDURE — U0005 INFEC AGEN DETEC AMPLI PROBE: HCPCS | Performed by: INTERNAL MEDICINE

## 2022-07-14 PROCEDURE — U0003 INFECTIOUS AGENT DETECTION BY NUCLEIC ACID (DNA OR RNA); SEVERE ACUTE RESPIRATORY SYNDROME CORONAVIRUS 2 (SARS-COV-2) (CORONAVIRUS DISEASE [COVID-19]), AMPLIFIED PROBE TECHNIQUE, MAKING USE OF HIGH THROUGHPUT TECHNOLOGIES AS DESCRIBED BY CMS-2020-01-R: HCPCS | Performed by: INTERNAL MEDICINE

## 2022-07-15 ENCOUNTER — LAB REQUISITION (OUTPATIENT)
Dept: LAB | Facility: HOSPITAL | Age: 77
End: 2022-07-15
Payer: MEDICARE

## 2022-07-15 DIAGNOSIS — Z11.59 ENCOUNTER FOR SCREENING FOR OTHER VIRAL DISEASES: ICD-10-CM

## 2022-07-15 DIAGNOSIS — Z03.818 ENCOUNTER FOR OBSERVATION FOR SUSPECTED EXPOSURE TO OTHER BIOLOGICAL AGENTS RULED OUT: ICD-10-CM

## 2022-07-15 LAB — SARS-COV-2 RNA RESP QL NAA+PROBE: NEGATIVE

## 2022-07-25 PROCEDURE — U0005 INFEC AGEN DETEC AMPLI PROBE: HCPCS | Performed by: INTERNAL MEDICINE

## 2022-07-25 PROCEDURE — U0003 INFECTIOUS AGENT DETECTION BY NUCLEIC ACID (DNA OR RNA); SEVERE ACUTE RESPIRATORY SYNDROME CORONAVIRUS 2 (SARS-COV-2) (CORONAVIRUS DISEASE [COVID-19]), AMPLIFIED PROBE TECHNIQUE, MAKING USE OF HIGH THROUGHPUT TECHNOLOGIES AS DESCRIBED BY CMS-2020-01-R: HCPCS | Performed by: INTERNAL MEDICINE

## 2022-07-26 ENCOUNTER — LAB REQUISITION (OUTPATIENT)
Dept: LAB | Facility: HOSPITAL | Age: 77
End: 2022-07-26
Payer: MEDICARE

## 2022-07-26 DIAGNOSIS — Z03.818 ENCOUNTER FOR OBSERVATION FOR SUSPECTED EXPOSURE TO OTHER BIOLOGICAL AGENTS RULED OUT: ICD-10-CM

## 2022-07-26 DIAGNOSIS — Z11.59 ENCOUNTER FOR SCREENING FOR OTHER VIRAL DISEASES: ICD-10-CM

## 2022-07-26 LAB — SARS-COV-2 RNA RESP QL NAA+PROBE: POSITIVE

## 2022-09-03 ENCOUNTER — HOSPITAL ENCOUNTER (EMERGENCY)
Facility: HOSPITAL | Age: 77
Discharge: HOME | End: 2022-09-04
Attending: EMERGENCY MEDICINE
Payer: MEDICARE

## 2022-09-03 ENCOUNTER — APPOINTMENT (EMERGENCY)
Dept: CT IMAGING | Facility: HOSPITAL | Age: 77
End: 2022-09-03
Payer: MEDICARE

## 2022-09-03 VITALS
HEART RATE: 50 BPM | SYSTOLIC BLOOD PRESSURE: 140 MMHG | OXYGEN SATURATION: 99 % | RESPIRATION RATE: 16 BRPM | TEMPERATURE: 98.1 F | DIASTOLIC BLOOD PRESSURE: 63 MMHG

## 2022-09-03 DIAGNOSIS — N39.0 UTI (URINARY TRACT INFECTION): Primary | ICD-10-CM

## 2022-09-03 DIAGNOSIS — S32.000A LUMBAR COMPRESSION FRACTURE (HCC): ICD-10-CM

## 2022-09-03 LAB
ALBUMIN SERPL BCP-MCNC: 4.3 G/DL (ref 3.5–5)
ALP SERPL-CCNC: 132 U/L (ref 34–104)
ALT SERPL W P-5'-P-CCNC: 18 U/L (ref 7–52)
ANION GAP SERPL CALCULATED.3IONS-SCNC: 6 MMOL/L (ref 4–13)
AST SERPL W P-5'-P-CCNC: 32 U/L (ref 13–39)
BACTERIA UR QL AUTO: ABNORMAL /HPF
BASOPHILS # BLD AUTO: 0.02 THOUSANDS/ΜL (ref 0–0.1)
BASOPHILS NFR BLD AUTO: 0 % (ref 0–1)
BILIRUB SERPL-MCNC: 0.82 MG/DL (ref 0.2–1)
BILIRUB UR QL STRIP: NEGATIVE
BUN SERPL-MCNC: 21 MG/DL (ref 5–25)
CALCIUM SERPL-MCNC: 9.9 MG/DL (ref 8.4–10.2)
CHLORIDE SERPL-SCNC: 104 MMOL/L (ref 96–108)
CLARITY UR: ABNORMAL
CO2 SERPL-SCNC: 28 MMOL/L (ref 21–32)
COLOR UR: YELLOW
CREAT SERPL-MCNC: 0.77 MG/DL (ref 0.6–1.3)
EOSINOPHIL # BLD AUTO: 0.02 THOUSAND/ΜL (ref 0–0.61)
EOSINOPHIL NFR BLD AUTO: 0 % (ref 0–6)
ERYTHROCYTE [DISTWIDTH] IN BLOOD BY AUTOMATED COUNT: 13.7 % (ref 11.6–15.1)
GFR SERPL CREATININE-BSD FRML MDRD: 86 ML/MIN/1.73SQ M
GLUCOSE SERPL-MCNC: 87 MG/DL (ref 65–140)
GLUCOSE UR STRIP-MCNC: NEGATIVE MG/DL
HCT VFR BLD AUTO: 40.8 % (ref 36.5–49.3)
HGB BLD-MCNC: 13.5 G/DL (ref 12–17)
HGB UR QL STRIP.AUTO: ABNORMAL
IMM GRANULOCYTES # BLD AUTO: 0.02 THOUSAND/UL (ref 0–0.2)
IMM GRANULOCYTES NFR BLD AUTO: 0 % (ref 0–2)
KETONES UR STRIP-MCNC: NEGATIVE MG/DL
LEUKOCYTE ESTERASE UR QL STRIP: NEGATIVE
LYMPHOCYTES # BLD AUTO: 1.61 THOUSANDS/ΜL (ref 0.6–4.47)
LYMPHOCYTES NFR BLD AUTO: 25 % (ref 14–44)
MCH RBC QN AUTO: 30.1 PG (ref 26.8–34.3)
MCHC RBC AUTO-ENTMCNC: 33.1 G/DL (ref 31.4–37.4)
MCV RBC AUTO: 91 FL (ref 82–98)
MONOCYTES # BLD AUTO: 0.55 THOUSAND/ΜL (ref 0.17–1.22)
MONOCYTES NFR BLD AUTO: 9 % (ref 4–12)
NEUTROPHILS # BLD AUTO: 4.26 THOUSANDS/ΜL (ref 1.85–7.62)
NEUTS SEG NFR BLD AUTO: 66 % (ref 43–75)
NITRITE UR QL STRIP: POSITIVE
NON-SQ EPI CELLS URNS QL MICRO: ABNORMAL /HPF
NRBC BLD AUTO-RTO: 0 /100 WBCS
PH UR STRIP.AUTO: 6 [PH]
PLATELET # BLD AUTO: 180 THOUSANDS/UL (ref 149–390)
PMV BLD AUTO: 9.7 FL (ref 8.9–12.7)
POTASSIUM SERPL-SCNC: 4 MMOL/L (ref 3.5–5.3)
PROT SERPL-MCNC: 6.9 G/DL (ref 6.4–8.4)
PROT UR STRIP-MCNC: ABNORMAL MG/DL
RBC # BLD AUTO: 4.49 MILLION/UL (ref 3.88–5.62)
RBC #/AREA URNS AUTO: ABNORMAL /HPF
SODIUM SERPL-SCNC: 138 MMOL/L (ref 135–147)
SP GR UR STRIP.AUTO: >=1.03 (ref 1–1.03)
UROBILINOGEN UR QL STRIP.AUTO: 1 E.U./DL
WBC # BLD AUTO: 6.48 THOUSAND/UL (ref 4.31–10.16)
WBC #/AREA URNS AUTO: ABNORMAL /HPF

## 2022-09-03 PROCEDURE — 99285 EMERGENCY DEPT VISIT HI MDM: CPT

## 2022-09-03 PROCEDURE — 80053 COMPREHEN METABOLIC PANEL: CPT

## 2022-09-03 PROCEDURE — 81001 URINALYSIS AUTO W/SCOPE: CPT

## 2022-09-03 PROCEDURE — 74176 CT ABD & PELVIS W/O CONTRAST: CPT

## 2022-09-03 PROCEDURE — 85025 COMPLETE CBC W/AUTO DIFF WBC: CPT

## 2022-09-03 PROCEDURE — 93005 ELECTROCARDIOGRAM TRACING: CPT

## 2022-09-03 PROCEDURE — 36415 COLL VENOUS BLD VENIPUNCTURE: CPT

## 2022-09-03 PROCEDURE — 99284 EMERGENCY DEPT VISIT MOD MDM: CPT

## 2022-09-03 PROCEDURE — G1004 CDSM NDSC: HCPCS

## 2022-09-03 RX ORDER — CEFPODOXIME PROXETIL 200 MG/1
200 TABLET, FILM COATED ORAL 2 TIMES DAILY WITH MEALS
Status: DISCONTINUED | OUTPATIENT
Start: 2022-09-03 | End: 2022-09-04

## 2022-09-03 RX ORDER — CEFPODOXIME PROXETIL 200 MG/1
200 TABLET, FILM COATED ORAL 2 TIMES DAILY
Qty: 14 TABLET | Refills: 0 | Status: SHIPPED | OUTPATIENT
Start: 2022-09-03 | End: 2022-09-10

## 2022-09-04 LAB
ATRIAL RATE: 53 BPM
P AXIS: 22 DEGREES
PR INTERVAL: 112 MS
QRS AXIS: 2 DEGREES
QRSD INTERVAL: 80 MS
QT INTERVAL: 438 MS
QTC INTERVAL: 410 MS
T WAVE AXIS: 77 DEGREES
VENTRICULAR RATE: 53 BPM

## 2022-09-04 PROCEDURE — 93010 ELECTROCARDIOGRAM REPORT: CPT | Performed by: INTERNAL MEDICINE

## 2022-09-04 PROCEDURE — 96365 THER/PROPH/DIAG IV INF INIT: CPT

## 2022-09-04 RX ORDER — CEFTRIAXONE 1 G/50ML
1000 INJECTION, SOLUTION INTRAVENOUS EVERY 24 HOURS
Status: DISCONTINUED | OUTPATIENT
Start: 2022-09-04 | End: 2022-09-04 | Stop reason: HOSPADM

## 2022-09-04 RX ADMIN — CEFTRIAXONE 1000 MG: 1 INJECTION, SOLUTION INTRAVENOUS at 00:06

## 2022-09-04 NOTE — ED NOTES
[facility] twice and got no answer   time is 0930 from [place]  Will continue to call and update facility       Martina Vigil  09/04/22 [id number]

## 2022-09-04 NOTE — ED NOTES
Patient resting comfortably in bed, no complaints at this time        Salazar Denise RN  09/04/22 8688

## 2022-09-04 NOTE — ED NOTES
Got a hold of Lima at [facility], explained pt will be picked up here at 0930        Akash Badillo RN  09/04/22 0900

## 2022-09-04 NOTE — ED NOTES
Called [facility] to set up transport back to nursing home     JEFFRY GAXIOLA|   [facility]  09/04/22 [facility]

## 2022-09-04 NOTE — ED NOTES
Patient resting in bed, no complaints at this time  Tried to call [facility] and got no response        Rodolfo Schwab RN  09/04/22 9213

## 2022-09-04 NOTE — ED PROVIDER NOTES
History  Chief Complaint   Patient presents with    Blood in Urine     Per EMS, pt in hospice care at [facility] - INPATIENT  Family called ems due to pt having blood in his urine x3 weeks and wanted pt evaluated  Pt nonverbal at baseline     Patient is a 76-year-old male presenting from seating [facility] for evaluation of 3 weeks of bloody urine  Patient was sent in by family, and not facility  Patient at baseline as he is normally nonverbal   Patient appears in no acute distress  Patient's abdomen is nontender  Patient has no grimace during exam   Patient's mentation is at baseline  Patient has a noted nonverbal diagnosis on paperwork from his facility  Prior to Admission Medications   Prescriptions Last Dose Informant Patient Reported?  Taking?   allopurinol (ZYLOPRIM) 300 mg tablet   Yes No   Sig: Take 300 mg by mouth daily   amLODIPine (NORVASC) 10 mg tablet   Yes No   Sig: Take 10 mg by mouth daily   aspirin 81 mg chewable tablet   No No   Sig: Chew 1 tablet (81 mg total) daily   atorvastatin (LIPITOR) 40 mg tablet   No No   Sig: Take 1 tablet (40 mg total) by mouth every evening   cyanocobalamin (VITAMIN B-12) 1000 MCG tablet   Yes No   Sig: Take 1,000 mcg by mouth daily   finasteride (PROSCAR) 5 mg tablet   Yes No   Sig: Take 5 mg by mouth daily   galantamine (RAZADYNE) 8 mg tablet   Yes No   Sig: Take 8 mg by mouth 2 (two) times a day   nitrofurantoin (MACROBID) 100 mg capsule   No No   Sig: Take 1 capsule (100 mg total) by mouth 2 (two) times a day   omeprazole (PriLOSEC) 20 mg delayed release capsule   Yes No   Sig: Take 20 mg by mouth daily   sertraline (ZOLOFT) 100 mg tablet   Yes No   Sig: Take 100 mg by mouth daily   tamsulosin (FLOMAX) 0 4 mg   No No   Sig: Take 1 capsule (0 4 mg total) by mouth daily with dinner      Facility-Administered Medications: None       Past Medical History:   Diagnosis Date    Cognitive impairment     GERD (gastroesophageal reflux disease)     Hypertension     Major depressive disorder     Parkinson disease ([facility] [address] )        History reviewed  No pertinent surgical history  History reviewed  No pertinent family history  I have reviewed and agree with the history as documented  E-Cigarette/Vaping    E-Cigarette Use Never User      E-Cigarette/Vaping Substances     Social History     Tobacco Use    Smoking status: Former Smoker    Smokeless tobacco: Never Used   Vaping Use    Vaping Use: Never used   Substance Use Topics    Alcohol use: Not Currently    Drug use: Never       Review of Systems   Unable to perform ROS: Patient nonverbal   All other systems reviewed and are negative  Physical Exam  Physical Exam  Vitals and nursing note reviewed  Constitutional:       Appearance: Normal appearance  He is normal weight  HENT:      Head: Normocephalic  Right Ear: External ear normal       Left Ear: External ear normal       Nose: Nose normal    Eyes:      Extraocular Movements: Extraocular movements intact  Pupils: Pupils are equal, round, and reactive to light  Cardiovascular:      Rate and Rhythm: Regular rhythm  Bradycardia present  Pulses: Normal pulses  Pulmonary:      Effort: Pulmonary effort is normal  No respiratory distress  Breath sounds: Normal breath sounds  Abdominal:      General: Abdomen is flat  Bowel sounds are normal  There is no distension  Palpations: Abdomen is soft  Genitourinary:     Penis: Normal        Testes: Normal    Musculoskeletal:         General: Normal range of motion  Cervical back: Normal range of motion  Skin:     General: Skin is warm  Capillary Refill: Capillary refill takes less than 2 seconds  Neurological:      General: No focal deficit present  Mental Status: He is alert  Mental status is at baseline        Comments: Per EMS, and family patient at his baseline     Psychiatric:         Mood and Affect: Mood normal          Vital Signs  ED Triage Vitals [09/03/22 2214] Temperature Pulse Respirations Blood Pressure SpO2   (!) 97 4 °F (36 3 °C) (!) 50 16 140/63 99 %      Temp Source Heart Rate Source Patient Position - Orthostatic VS BP Location FiO2 (%)   Tympanic Monitor Sitting Left arm --      Pain Score       --           Vitals:    09/03/22 2214   BP: 140/63   Pulse: (!) 50   Patient Position - Orthostatic VS: Sitting         Visual Acuity      ED Medications  Medications - No data to display    Diagnostic Studies  Results Reviewed     Procedure Component Value Units Date/Time    Urine Microscopic [[id number]]  (Abnormal) Collected: 09/03/22 2243    Lab Status: Final result Specimen: Urine, Straight Cath Updated: 09/03/22 2258     RBC, UA 1-2 /hpf      WBC, UA 2-4 /hpf      Epithelial Cells Occasional /hpf      Bacteria, UA Innumerable /hpf     Comprehensive metabolic panel [[id number]]  (Abnormal) Collected: 09/03/22 2233    Lab Status: Final result Specimen: Blood from Arm, Right Updated: 09/03/22 2255     Sodium 138 mmol/L      Potassium 4 0 mmol/L      Chloride 104 mmol/L      CO2 28 mmol/L      ANION GAP 6 mmol/L      BUN 21 mg/dL      Creatinine 0 77 mg/dL      Glucose 87 mg/dL      Calcium 9 9 mg/dL      AST 32 U/L      ALT 18 U/L      Alkaline Phosphatase 132 U/L      Total Protein 6 9 g/dL      Albumin 4 3 g/dL      Total Bilirubin 0 82 mg/dL      eGFR 86 ml/min/1 73sq m     Narrative:      [place] guidelines for Chronic Kidney Disease (CKD):     Stage 1 with normal or high GFR (GFR > 90 mL/min/1 73 square meters)    Stage 2 Mild CKD (GFR = 60-89 mL/min/1 73 square meters)    Stage 3A Moderate CKD (GFR = 45-59 mL/min/1 73 square meters)    Stage 3B Moderate CKD (GFR = 30-44 mL/min/1 73 square meters)    Stage 4 Severe CKD (GFR = 15-29 mL/min/1 73 square meters)    Stage 5 End Stage CKD (GFR <15 mL/min/1 73 square meters)  Note: GFR calculation is accurate only with a steady state creatinine    UA w Reflex to Microscopic w Reflex to Culture [[id number]]  (Abnormal) Collected: 09/03/22 2243    Lab Status: Final result Specimen: Urine, Straight Cath Updated: 09/03/22 2250     Color, UA Yellow     Clarity, UA Slightly Cloudy     Specific Gravity, UA >=1 030     pH, UA 6 0     Leukocytes, UA Negative     Nitrite, UA Positive     Protein, UA Trace mg/dl      Glucose, UA Negative mg/dl      Ketones, UA Negative mg/dl      Urobilinogen, UA 1 0 E U /dl      Bilirubin, UA Negative     Occult Blood, UA 3+    CBC and differential [[id number]] Collected: 09/03/22 2233    Lab Status: Final result Specimen: Blood from Arm, Right Updated: 09/03/22 2238     WBC 6 48 Thousand/uL      RBC 4 49 Million/uL      Hemoglobin 13 5 g/dL      Hematocrit 40 8 %      MCV 91 fL      MCH 30 1 pg      MCHC 33 1 g/dL      RDW 13 7 %      MPV 9 7 fL      Platelets 876 Thousands/uL      nRBC 0 /100 WBCs      Neutrophils Relative 66 %      Immat GRANS % 0 %      Lymphocytes Relative 25 %      Monocytes Relative 9 %      Eosinophils Relative 0 %      Basophils Relative 0 %      Neutrophils Absolute 4 26 Thousands/µL      Immature Grans Absolute 0 02 Thousand/uL      Lymphocytes Absolute 1 61 Thousands/µL      Monocytes Absolute 0 55 Thousand/µL      Eosinophils Absolute 0 02 Thousand/µL      Basophils Absolute 0 02 Thousands/µL                  CT abdomen pelvis wo contrast   Final Result by Mike Galloway MD (09/03 2359)      Nonobstructing right nephrolithiasis  No hydronephrosis  Compression fracture deformities at L1 and L4 which were not present on CT examination of 4/16/2021  Fracture deformity at the L1 vertebral body appears acute  Workstation performed: [id number]                    Procedures  Procedures         ED Course  ED Course as of 09/04/22 1552   Sat Sep 03, 2022   2240 Temperature: 98 1 °F (36 7 °C)   2337 Discussed with patient's family that patient patient is at baseline, and he does wax and wane    Discussed that their concern was a had 3 weeks blood in his urine  Family was also concerned about dehydration  Discussed lab and UA not indicative of dehydration norma  Karina Falguni Sep 04, 2022   0012 Per family patient takes his pills during the day, but when he is sleeping at night he has difficulty taking pills  Will provide 1 dose of IV antibiotics tonight  0045 Discussed CT findings of kidney stone with pt's daughter  Aware to follow up with urology  Discussed incidental findings of L spine compression fractures  Family denies recent injury or fall, and report he is non-ambulatory  Discussed follow up with orthopedics  Offered LSO, which was declined  Again, confirmed pt's mentation is at baseline with family  Family offers no other concerns for pt  Pt has morphine PRN for pain already listed on MAR  SBIRT 20yo+    Flowsheet Row Most Recent Value   SBIRT (23 yo +)    In order to provide better care to our patients, we are screening all of our patients for alcohol and drug use  Would it be okay to ask you these screening questions? Unable to answer at this time Filed at: 09/03/2022 2217                    MDM  Number of Diagnoses or Management Options  Lumbar compression fracture (HCC)  UTI (urinary tract infection)  Diagnosis management comments: DDx: UTI, Hematuria   Patient is being sent in by his daughter after she was concerned for three weeks of hematuria  Daughter endorses pt is at his mentation baseline  Pt has no fever, tachycardia, hypotension  On exam patient offers no complaints when palpating his abdomen or chest  Pt has no pain on his back  Patient has no gross blood from his penis  Due to limited participation by pt, will do basic blood work to assess for anemia, infection, KURT, electrolyte abnormality, and obtain CT abdomen/pelvis to assess for etiology of hematuria  No acute findings on blood work tonight  Pt's urine demonstrating UTI  Review of chart for previous urine culture   As stated provided one dose of IV antibiotics as pt is sleeping and does not typically take pills at night  Discussed case with Dr Justin as pt has a kidney stone in the presence of an apparent UTI  States that as there is no obstruction to kidney, there is no infected stone  Discussed pt can follow up outpatient  Reviewed findings with daughter and aware to follow up with urology  Discussed we are treating him for a UTI tonight with a culture that is pending, and possible antibiotic change  Incidental finding compression fractures discussed as documented  Aware to follow up with orthopedics  As documented no known falls or trauma  Pt is sedentary, and nonambulatory  Family denies any notable changes to his ambulation  Pt has no outward signs of trauma  LSO brace offered and declined  Discussed pt is on hospice, and this was a recent development and decision by pt's family  Discussed strict return precautions for pt  Daughter feels comfortable with discharge and discharge plan back to facility with outpatient follow up  Daughter has no further concerns regarding pt outside of his hematuria  RN staff to set up transport back to facility  Amount and/or Complexity of Data Reviewed  Clinical lab tests: reviewed and ordered  Tests in the radiology section of CPT®: ordered and reviewed    Risk of Complications, Morbidity, and/or Mortality  General comments: Pt arrived for evaluation of three weeks of hematuria  Found to have UTI  CT findings as documented and discussed with family to have appropriate follow up with urology and orthopedics  Reviewed reasons to return to ed  Patient verbalized understanding of diagnosis and agreement with discharge plan of care as well as understanding of reasons to return to ed       Patient Progress  Patient progress: stable      Disposition  Final diagnoses:   UTI (urinary tract infection)   Lumbar compression fracture ([facility] [address] )     Time reflects when diagnosis was documented in both MDM as applicable and the Disposition within this note     Time User Action Codes Description Comment    9/3/2022 11:41 PM Nikki Mess Add [N39 0] UTI (urinary tract infection)     9/4/2022 12:43 AM Nikki Mess Add [S32 000S] Compression fracture of lumbar vertebra, sequela     9/4/2022 12:43 AM Nikki Mess Remove [S32 000S] Compression fracture of lumbar vertebra, sequela     9/4/2022 12:43 AM Nikki Mess Add [S32 000A] Lumbar compression fracture [facility])       ED Disposition     ED Disposition   Discharge    Condition   Stable    Date/Time   Sun Sep 4, 2022 12:43 AM    Comment   Raulluis Worley discharge to home/self care                 Follow-up Information     Follow up With Specialties Details Why Contact Info Additional Information    Fernando Valerio MD Internal Medicine Schedule an appointment as soon as possible for a visit  For further evaluation of symptoms [address] Emergency Department Emergency Medicine Go to  If symptoms worsen [address]  [facility] [id number]  [facility] Emergency Department, [address] [facility] Jazmine little, Can Suárez [address] [facility] Schedule an appointment as soon as possible for a visit in 2 days For further evaluation of symptoms [address] [facility] [address] [place] [id number]  [address] Specialists Jazmine little, [address] [place], [place], Valeriy [address]    [address] [facility] [facility] Schedule an appointment as soon as possible for a visit in 2 days For further evaluation of symptoms [facility] [id number] [address] [facility] [place], [address] [place] [address], [id number], [phone] Discharge Medication List as of 9/4/2022 12:45 AM      START taking these medications    Details   cefpodoxime (VANTIN) 200 mg tablet Take 1 tablet (200 mg total) by mouth 2 (two) times a day for 7 days, Starting Sat 9/3/2022, Until Sat 9/10/2022, Normal         CONTINUE these medications which have NOT CHANGED    Details   allopurinol (ZYLOPRIM) 300 mg tablet Take 300 mg by mouth daily, Historical Med      amLODIPine (NORVASC) 10 mg tablet Take 10 mg by mouth daily, Historical Med      aspirin 81 mg chewable tablet Chew 1 tablet (81 mg total) daily, Starting Sat 12/28/2019, Normal      atorvastatin (LIPITOR) 40 mg tablet Take 1 tablet (40 mg total) by mouth every evening, Starting Fri 12/27/2019, Normal      cyanocobalamin (VITAMIN B-12) 1000 MCG tablet Take 1,000 mcg by mouth daily, Historical Med      finasteride (PROSCAR) 5 mg tablet Take 5 mg by mouth daily, Historical Med      galantamine (RAZADYNE) 8 mg tablet Take 8 mg by mouth 2 (two) times a day, Historical Med      nitrofurantoin (MACROBID) 100 mg capsule Take 1 capsule (100 mg total) by mouth 2 (two) times a day, Starting Tue 4/20/2021, Normal      omeprazole (PriLOSEC) 20 mg delayed release capsule Take 20 mg by mouth daily, Historical Med      sertraline (ZOLOFT) 100 mg tablet Take 100 mg by mouth daily, Historical Med      tamsulosin (FLOMAX) 0 4 mg Take 1 capsule (0 4 mg total) by mouth daily with dinner, Starting Fri 12/27/2019, Normal             No discharge procedures on file      PDMP Review     None          ED Provider  Electronically Signed by           HEIDI Odom  09/04/22 2089

## 2024-12-17 NOTE — ASSESSMENT & PLAN NOTE
Patient continues to have urinary retention  Urology consultation appreciated  Camargo catheter placed on 12/26    Flomax started by Urology 12/26 this was continued on discharge  Camargo catheter to remain in place x 1 week, patient will need to follow-up with Urology in the office for a voiding trial  